# Patient Record
Sex: FEMALE | Race: WHITE | NOT HISPANIC OR LATINO | Employment: PART TIME | URBAN - METROPOLITAN AREA
[De-identification: names, ages, dates, MRNs, and addresses within clinical notes are randomized per-mention and may not be internally consistent; named-entity substitution may affect disease eponyms.]

---
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
## 2022-10-20 ENCOUNTER — APPOINTMENT (OUTPATIENT)
Dept: GENERAL RADIOLOGY | Facility: HOSPITAL | Age: 22
End: 2022-10-20

## 2022-10-20 ENCOUNTER — HOSPITAL ENCOUNTER (EMERGENCY)
Facility: HOSPITAL | Age: 22
Discharge: HOME | End: 2022-10-21
Attending: EMERGENCY MEDICINE | Admitting: EMERGENCY MEDICINE

## 2022-10-20 DIAGNOSIS — M94.0 COSTOCHONDRITIS: ICD-10-CM

## 2022-10-20 DIAGNOSIS — R07.89 RIGHT-SIDED CHEST WALL PAIN: Primary | ICD-10-CM

## 2022-10-20 PROCEDURE — 93005 ELECTROCARDIOGRAM TRACING: CPT

## 2022-10-20 PROCEDURE — 71045 X-RAY EXAM CHEST 1 VIEW: CPT

## 2022-10-20 PROCEDURE — 93005 ELECTROCARDIOGRAM TRACING: CPT | Performed by: EMERGENCY MEDICINE

## 2022-10-20 PROCEDURE — 93010 ELECTROCARDIOGRAM REPORT: CPT | Performed by: INTERNAL MEDICINE

## 2022-10-20 PROCEDURE — 99283 EMERGENCY DEPT VISIT LOW MDM: CPT

## 2022-10-20 PROCEDURE — 36415 COLL VENOUS BLD VENIPUNCTURE: CPT

## 2022-10-21 VITALS
OXYGEN SATURATION: 99 % | HEART RATE: 70 BPM | SYSTOLIC BLOOD PRESSURE: 124 MMHG | RESPIRATION RATE: 18 BRPM | TEMPERATURE: 98.2 F | BODY MASS INDEX: 44.55 KG/M2 | DIASTOLIC BLOOD PRESSURE: 75 MMHG | WEIGHT: 242.06 LBS | HEIGHT: 62 IN

## 2022-10-21 LAB
ALBUMIN SERPL-MCNC: 4 G/DL (ref 3.5–5.2)
ALBUMIN/GLOB SERPL: 1.2 G/DL
ALP SERPL-CCNC: 81 U/L (ref 39–117)
ALT SERPL W P-5'-P-CCNC: 12 U/L (ref 1–33)
AMPHET+METHAMPHET UR QL: NEGATIVE
ANION GAP SERPL CALCULATED.3IONS-SCNC: 9.8 MMOL/L (ref 5–15)
AST SERPL-CCNC: 15 U/L (ref 1–32)
BARBITURATES UR QL SCN: NEGATIVE
BASOPHILS # BLD AUTO: 0.06 10*3/MM3 (ref 0–0.2)
BASOPHILS NFR BLD AUTO: 0.5 % (ref 0–1.5)
BENZODIAZ UR QL SCN: NEGATIVE
BILIRUB SERPL-MCNC: <0.2 MG/DL (ref 0–1.2)
BUN SERPL-MCNC: 14 MG/DL (ref 6–20)
BUN/CREAT SERPL: 18.4 (ref 7–25)
CALCIUM SPEC-SCNC: 9.1 MG/DL (ref 8.6–10.5)
CANNABINOIDS SERPL QL: NEGATIVE
CHLORIDE SERPL-SCNC: 106 MMOL/L (ref 98–107)
CO2 SERPL-SCNC: 24.2 MMOL/L (ref 22–29)
COCAINE UR QL: NEGATIVE
CREAT SERPL-MCNC: 0.76 MG/DL (ref 0.57–1)
DEPRECATED RDW RBC AUTO: 39.5 FL (ref 37–54)
EGFRCR SERPLBLD CKD-EPI 2021: 114.5 ML/MIN/1.73
EOSINOPHIL # BLD AUTO: 0.07 10*3/MM3 (ref 0–0.4)
EOSINOPHIL NFR BLD AUTO: 0.5 % (ref 0.3–6.2)
ERYTHROCYTE [DISTWIDTH] IN BLOOD BY AUTOMATED COUNT: 12 % (ref 12.3–15.4)
GLOBULIN UR ELPH-MCNC: 3.3 GM/DL
GLUCOSE SERPL-MCNC: 92 MG/DL (ref 65–99)
HCG INTACT+B SERPL-ACNC: <0.5 MIU/ML
HCT VFR BLD AUTO: 44.2 % (ref 34–46.6)
HGB BLD-MCNC: 14.3 G/DL (ref 12–15.9)
IMM GRANULOCYTES # BLD AUTO: 0.03 10*3/MM3 (ref 0–0.05)
IMM GRANULOCYTES NFR BLD AUTO: 0.2 % (ref 0–0.5)
LYMPHOCYTES # BLD AUTO: 2.57 10*3/MM3 (ref 0.7–3.1)
LYMPHOCYTES NFR BLD AUTO: 19.4 % (ref 19.6–45.3)
MCH RBC QN AUTO: 29.1 PG (ref 26.6–33)
MCHC RBC AUTO-ENTMCNC: 32.4 G/DL (ref 31.5–35.7)
MCV RBC AUTO: 89.8 FL (ref 79–97)
METHADONE UR QL SCN: NEGATIVE
MONOCYTES # BLD AUTO: 1.11 10*3/MM3 (ref 0.1–0.9)
MONOCYTES NFR BLD AUTO: 8.4 % (ref 5–12)
NEUTROPHILS NFR BLD AUTO: 71 % (ref 42.7–76)
NEUTROPHILS NFR BLD AUTO: 9.41 10*3/MM3 (ref 1.7–7)
NRBC BLD AUTO-RTO: 0 /100 WBC (ref 0–0.2)
OPIATES UR QL: NEGATIVE
OXYCODONE UR QL SCN: NEGATIVE
PLATELET # BLD AUTO: 356 10*3/MM3 (ref 140–450)
PMV BLD AUTO: 8.8 FL (ref 6–12)
POTASSIUM SERPL-SCNC: 3.9 MMOL/L (ref 3.5–5.2)
PROT SERPL-MCNC: 7.3 G/DL (ref 6–8.5)
RBC # BLD AUTO: 4.92 10*6/MM3 (ref 3.77–5.28)
SODIUM SERPL-SCNC: 140 MMOL/L (ref 136–145)
T4 FREE SERPL-MCNC: 0.92 NG/DL (ref 0.93–1.7)
TROPONIN T SERPL-MCNC: <0.01 NG/ML (ref 0–0.03)
TSH SERPL DL<=0.05 MIU/L-ACNC: 1.85 UIU/ML (ref 0.27–4.2)
WBC NRBC COR # BLD: 13.25 10*3/MM3 (ref 3.4–10.8)

## 2022-10-21 PROCEDURE — 80307 DRUG TEST PRSMV CHEM ANLYZR: CPT | Performed by: NURSE PRACTITIONER

## 2022-10-21 PROCEDURE — 36415 COLL VENOUS BLD VENIPUNCTURE: CPT

## 2022-10-21 PROCEDURE — 80050 GENERAL HEALTH PANEL: CPT | Performed by: NURSE PRACTITIONER

## 2022-10-21 PROCEDURE — 84439 ASSAY OF FREE THYROXINE: CPT | Performed by: NURSE PRACTITIONER

## 2022-10-21 PROCEDURE — 84702 CHORIONIC GONADOTROPIN TEST: CPT | Performed by: NURSE PRACTITIONER

## 2022-10-21 PROCEDURE — 84484 ASSAY OF TROPONIN QUANT: CPT | Performed by: NURSE PRACTITIONER

## 2022-10-21 RX ORDER — PREDNISONE 20 MG/1
60 TABLET ORAL DAILY
Qty: 15 TABLET | Refills: 0 | Status: SHIPPED | OUTPATIENT
Start: 2022-10-21 | End: 2022-10-26

## 2022-10-21 NOTE — ED PROVIDER NOTES
Time: 10:29 PM EDT  Chief Complaint:   Chief Complaint   Patient presents with   • Chest Pain   • Anxiety       History of Present Illness:  Patient is a 22 y.o. year old female who presents to the emergency department with c/o chest pain and anxiety.        History provided by:  Patient  Chest Pain  Pain location:  R chest  Pain quality: stabbing    Pain radiates to:  Does not radiate  Pain severity:  Moderate  Onset quality:  Gradual  Duration:  2 days  Timing:  Intermittent  Progression:  Waxing and waning  Chronicity:  New  Context: movement    Relieved by:  Nothing  Worsened by:  Nothing  Ineffective treatments:  None tried  Associated symptoms: anxiety    Associated symptoms: no abdominal pain, no altered mental status, no back pain, no cough, no diaphoresis, no dizziness, no dysphagia, no fatigue, no fever, no headache, no heartburn, no lower extremity edema, no nausea, no near-syncope, no numbness, no orthopnea, no palpitations, no PND, no shortness of breath, no syncope, no vomiting and no weakness    Risk factors: obesity    Anxiety  Symptoms include chest pain and nervous/anxious behavior. Patient reports no dizziness, nausea, palpitations or shortness of breath.           Patient Care Team  Primary Care Provider: Provider, No Known    Past Medical History:     Allergies   Allergen Reactions   • Coconut Unknown - Low Severity     History reviewed. No pertinent past medical history.  History reviewed. No pertinent surgical history.  History reviewed. No pertinent family history.    Home Medications:  Prior to Admission medications    Not on File        Social History:          Review of Systems:  Review of Systems   Constitutional: Negative for chills, diaphoresis, fatigue and fever.   HENT: Negative for ear pain, rhinorrhea, sore throat and trouble swallowing.    Eyes: Negative for visual disturbance.   Respiratory: Negative for cough, chest tightness and shortness of breath.    Cardiovascular: Positive  "for chest pain. Negative for palpitations, orthopnea, syncope, PND and near-syncope.   Gastrointestinal: Negative for abdominal pain, diarrhea, heartburn, nausea and vomiting.   Genitourinary: Negative for difficulty urinating and flank pain.   Musculoskeletal: Negative for arthralgias, back pain and myalgias.   Skin: Negative for rash.   Neurological: Negative for dizziness, weakness, light-headedness, numbness and headaches.   Hematological: Negative for adenopathy.   Psychiatric/Behavioral: The patient is nervous/anxious.    All other systems reviewed and are negative.       Physical Exam:  /71   Pulse 78   Temp 98.2 °F (36.8 °C)   Resp 18   Ht 157.5 cm (62\")   Wt 110 kg (242 lb 1 oz)   LMP  (LMP Unknown)   SpO2 100%   BMI 44.27 kg/m²     Physical Exam  Vitals and nursing note reviewed.   Constitutional:       General: She is not in acute distress.     Appearance: Normal appearance. She is not toxic-appearing.   HENT:      Head: Normocephalic and atraumatic.      Nose: Nose normal.      Mouth/Throat:      Mouth: Mucous membranes are moist.   Eyes:      Extraocular Movements: Extraocular movements intact.      Conjunctiva/sclera: Conjunctivae normal.      Pupils: Pupils are equal, round, and reactive to light.   Cardiovascular:      Rate and Rhythm: Normal rate and regular rhythm.      Pulses: Normal pulses.      Heart sounds: Normal heart sounds.   Pulmonary:      Effort: Pulmonary effort is normal.      Breath sounds: Normal breath sounds.   Chest:      Chest wall: Tenderness ( right chest wall) present.   Abdominal:      General: Bowel sounds are normal. There is no distension.      Palpations: Abdomen is soft.      Tenderness: There is no abdominal tenderness.   Musculoskeletal:         General: Normal range of motion.      Cervical back: Normal range of motion and neck supple.      Right lower leg: No edema.      Left lower leg: No edema.   Skin:     General: Skin is warm and dry. "   Neurological:      General: No focal deficit present.      Mental Status: She is alert and oriented to person, place, and time.   Psychiatric:         Mood and Affect: Mood normal.         Behavior: Behavior normal.         Thought Content: Thought content normal.         Judgment: Judgment normal.            Medications in the Emergency Department:  Medications - No data to display     Labs  Lab Results (last 24 hours)     Procedure Component Value Units Date/Time    hCG, Quantitative, Pregnancy [[id number]] Collected: 10/21/22 0016    Specimen: Blood from Arm, Right Updated: 10/21/22 0043     HCG Quantitative <0.50 mIU/mL     Narrative:      HCG Ranges by Gestational Age    Females - non-pregnant premenopausal   </= 1mIU/mL HCG  Females - postmenopausal               </= 7mIU/mL HCG    3 Weeks       5.4   -      72 mIU/mL  4 Weeks      10.2   -     708 mIU/mL  5 Weeks       217   -   8,245 mIU/mL  6 Weeks       152   -  32,177 mIU/mL  7 Weeks     4,059   - 153,767 mIU/mL  8 Weeks    31,366   - 149,094 mIU/mL  9 Weeks    59,109   - 135,901 mIU/mL  10 Weeks   44,186   - 170,409 mIU/mL  12 Weeks   27,107   - 201,615 mIU/mL  14 Weeks   24,302   -  93,646 mIU/mL  15 Weeks   12,540   -  69,747 mIU/mL  16 Weeks    8,904   -  55,332 mIU/mL  17 Weeks    8,240   -  51,793 mIU/mL  18 Weeks    9,649   -  55,271 mIU/mL    Results may be falsely decreased if patient taking Biotin.      CBC & Differential [[id number]]  (Abnormal) Collected: 10/21/22 0016    Specimen: Blood from Arm, Right Updated: 10/21/22 0025    Narrative:      The following orders were created for panel order CBC & Differential.  Procedure                               Abnormality         Status                     ---------                               -----------         ------                     CBC Auto Differential[[id number]]        Abnormal            Final result                 Please view results for these tests on the individual orders.     Troponin [[id number]]  (Normal) Collected: 10/21/22 0016    Specimen: Blood from Arm, Right Updated: 10/21/22 0046     Troponin T <0.010 ng/mL     Narrative:      Troponin T Reference Range:  <= 0.03 ng/mL-   Negative for AMI  >0.03 ng/mL-     Abnormal for myocardial necrosis.  Clinicians would have to utilize clinical acumen, EKG, Troponin and serial changes to determine if it is an Acute Myocardial Infarction or myocardial injury due to an underlying chronic condition.       Results may be falsely decreased if patient taking Biotin.      Comprehensive Metabolic Panel [[id number]] Collected: 10/21/22 0016    Specimen: Blood from Arm, Right Updated: 10/21/22 0046     Glucose 92 mg/dL      BUN 14 mg/dL      Creatinine 0.76 mg/dL      Sodium 140 mmol/L      Potassium 3.9 mmol/L      Chloride 106 mmol/L      CO2 24.2 mmol/L      Calcium 9.1 mg/dL      Total Protein 7.3 g/dL      Albumin 4.00 g/dL      ALT (SGPT) 12 U/L      AST (SGOT) 15 U/L      Alkaline Phosphatase 81 U/L      Total Bilirubin <0.2 mg/dL      Globulin 3.3 gm/dL      A/G Ratio 1.2 g/dL      BUN/Creatinine Ratio 18.4     Anion Gap 9.8 mmol/L      eGFR 114.5 mL/min/1.73      Comment: National Kidney Foundation and American Society of Nephrology (ASN) Task Force recommended calculation based on the Chronic Kidney Disease Epidemiology Collaboration (CKD-EPI) equation refit without adjustment for race.       Narrative:      GFR Normal >60  Chronic Kidney Disease <60  Kidney Failure <15      CBC Auto Differential [[id number]]  (Abnormal) Collected: 10/21/22 0016    Specimen: Blood from Arm, Right Updated: 10/21/22 0025     WBC 13.25 10*3/mm3      RBC 4.92 10*6/mm3      Hemoglobin 14.3 g/dL      Hematocrit 44.2 %      MCV 89.8 fL      MCH 29.1 pg      MCHC 32.4 g/dL      RDW 12.0 %      RDW-SD 39.5 fl      MPV 8.8 fL      Platelets 356 10*3/mm3      Neutrophil % 71.0 %      Lymphocyte % 19.4 %      Monocyte % 8.4 %      Eosinophil % 0.5 %      Basophil % 0.5 %       Immature Grans % 0.2 %      Neutrophils, Absolute 9.41 10*3/mm3      Lymphocytes, Absolute 2.57 10*3/mm3      Monocytes, Absolute 1.11 10*3/mm3      Eosinophils, Absolute 0.07 10*3/mm3      Basophils, Absolute 0.06 10*3/mm3      Immature Grans, Absolute 0.03 10*3/mm3      nRBC 0.0 /100 WBC     TSH [[id number]]  (Normal) Collected: 10/21/22 0016    Specimen: Blood from Arm, Right Updated: 10/21/22 0108     TSH 1.850 uIU/mL     T4, Free [[id number]]  (Abnormal) Collected: 10/21/22 0016    Specimen: Blood from Arm, Right Updated: 10/21/22 0108     Free T4 0.92 ng/dL     Narrative:      Results may be falsely increased if patient taking Biotin.      Urine Drug Screen - Urine, Clean Catch [[id number]]  (Normal) Collected: 10/21/22 0135    Specimen: Urine, Clean Catch Updated: 10/21/22 0203     Amphet/Methamphet, Screen Negative     Barbiturates Screen, Urine Negative     Benzodiazepine Screen, Urine Negative     Cocaine Screen, Urine Negative     Opiate Screen Negative     THC, Screen, Urine Negative     Methadone Screen, Urine Negative     Oxycodone Screen, Urine Negative    Narrative:      Negative Thresholds Per Drugs Screened:    Amphetamines                 500 ng/ml  Barbiturates                 200 ng/ml  Benzodiazepines              100 ng/ml  Cocaine                      300 ng/ml  Methadone                    300 ng/ml  Opiates                      300 ng/ml  Oxycodone                    100 ng/ml  THC                           50 ng/ml    The Normal Value for all drugs tested is negative. This report includes final unconfirmed screening results to be used for medical treatment purposes only. Unconfirmed results must not be used for non-medical purposes such as employment or legal testing. Clinical consideration should be applied to any drug of abuse test, particularly when unconfirmed results are used.                   Imaging:  XR Chest 1 View    Result Date: 10/21/2022  PROCEDURE: XR CHEST 1 VW   COMPARISON: None..  INDICATIONS: Chest pain  FINDINGS: A single frontal (AP or PA upright portable) chest radiograph reveals no cardiac enlargement and no acute infiltrate. No pneumothorax is seen.  External artifacts obscure detail.       No acute cardiopulmonary disease is seen radiographically.     Please note that portions of this note were completed with a voice recognition program.  GILLIAN ANDERSON JR, MD       Electronically Signed and Approved By: GILLIAN ANDERSON JR, MD on 10/21/2022 at 1:11              Progress  ED Course as of 10/21/22 0205   Thu Oct 20, 2022   2227 --- PROVIDER IN TRIAGE NOTE ---    The patient was seen and evaluated by marin Bales in triage. Orders were placed and the patient is currently awaiting disposition. [KS]   Fri Oct 21, 2022   0156 XR Chest 1 View  negative [DS]      ED Course User Index  [DS] Mirna Edmonds APRN  [KS] Emeli Bales APRN           HEART Score: 0                  The patient was initially evaluated in the triage area where orders were placed. The patient was later dispositioned by DUNIA Vickers.      The patient was advised to stay for completion of workup which includes but is not limited to communication of labs and radiological results, reassessment and plan. The patient was advised that leaving prior to disposition by a provider could result in critical findings that are not communicated to the patient.     Medical Decision Making:  MDM  Number of Diagnoses or Management Options  Right-sided chest wall pain  Diagnosis management comments: I have spoken with the patient. I have explained the patient´s condition, diagnoses and treatment plan based on the information available to me at this time. I have answered the patient's questions and addressed any concerns. The patient has a good  understanding of the patient´s diagnosis, condition, and treatment plan as can be expected at this point. The vital signs have been stable. The patient´s condition is  stable and appropriate for discharge from the emergency department.      The patient will pursue further outpatient evaluation with the primary care physician or other designated or consulting physician as outlined in the discharge instructions. They are agreeable to this plan of care and follow-up instructions have been explained in detail. The patient has received these instructions in written format and have expressed an understanding of the discharge instructions. The patient is aware that any significant change in condition or worsening of symptoms should prompt an immediate return to this or the closest emergency department or call to 911.         Amount and/or Complexity of Data Reviewed  Clinical lab tests: reviewed and ordered  Tests in the radiology section of CPT®: reviewed and ordered  Tests in the medicine section of CPT®: reviewed and ordered    Risk of Complications, Morbidity, and/or Mortality  Presenting problems: moderate  Diagnostic procedures: low  Management options: low    Patient Progress  Patient progress: stable           The following orders were placed after triage and evaluation:  Orders Placed This Encounter   Procedures   • XR Chest 1 View   • hCG, Quantitative, Pregnancy   • Troponin   • Comprehensive Metabolic Panel   • CBC Auto Differential   • Urine Drug Screen - Urine, Clean Catch   • TSH   • T4, Free   • ECG 12 Lead   • CBC & Differential       Final diagnoses:   Right-sided chest wall pain          Disposition:  ED Disposition     ED Disposition   Discharge    Condition   Stable    Comment   --             This medical record created using voice recognition software.           Mirna Edmonds APRN  10/21/22 0205       Mirna Edmonds APRN  10/21/22 0208

## 2022-10-21 NOTE — DISCHARGE INSTRUCTIONS
All testing here today was negative including chest x-ray    Take medication as prescribed for symptomatic treatment    Return for new or worsening symptoms    Follow-up with PCP

## 2022-10-28 LAB — QT INTERVAL: 370 MS

## 2023-07-20 PROBLEM — E66.9 OBESITY: Status: ACTIVE | Noted: 2023-07-12

## 2023-07-20 PROBLEM — K59.04 CHRONIC IDIOPATHIC CONSTIPATION: Status: ACTIVE | Noted: 2023-07-20

## 2023-07-20 PROBLEM — F41.9 ANXIETY: Status: ACTIVE | Noted: 2023-07-20

## 2023-07-20 PROBLEM — G93.32 CHRONIC FATIGUE SYNDROME: Status: ACTIVE | Noted: 2023-07-20

## 2023-09-07 ENCOUNTER — HOSPITAL ENCOUNTER (EMERGENCY)
Facility: HOSPITAL | Age: 23
Discharge: HOME | End: 2023-09-07
Attending: EMERGENCY MEDICINE
Payer: COMMERCIAL

## 2023-09-07 ENCOUNTER — APPOINTMENT (OUTPATIENT)
Dept: CT IMAGING | Facility: HOSPITAL | Age: 23
End: 2023-09-07
Payer: COMMERCIAL

## 2023-09-07 VITALS
BODY MASS INDEX: 39.11 KG/M2 | HEIGHT: 62 IN | HEART RATE: 76 BPM | RESPIRATION RATE: 18 BRPM | DIASTOLIC BLOOD PRESSURE: 85 MMHG | WEIGHT: 212.52 LBS | SYSTOLIC BLOOD PRESSURE: 103 MMHG | TEMPERATURE: 98.8 F | OXYGEN SATURATION: 100 %

## 2023-09-07 DIAGNOSIS — N20.0 RIGHT KIDNEY STONE: ICD-10-CM

## 2023-09-07 DIAGNOSIS — N23 RENAL COLIC: Primary | ICD-10-CM

## 2023-09-07 LAB
ALBUMIN SERPL-MCNC: 4 G/DL (ref 3.5–5.2)
ALBUMIN/GLOB SERPL: 1.3 G/DL
ALP SERPL-CCNC: 93 U/L (ref 39–117)
ALT SERPL W P-5'-P-CCNC: 33 U/L (ref 1–33)
ANION GAP SERPL CALCULATED.3IONS-SCNC: 8.5 MMOL/L (ref 5–15)
AST SERPL-CCNC: 19 U/L (ref 1–32)
BACTERIA UR QL AUTO: ABNORMAL /HPF
BASOPHILS # BLD AUTO: 0.05 10*3/MM3 (ref 0–0.2)
BASOPHILS NFR BLD AUTO: 0.4 % (ref 0–1.5)
BILIRUB SERPL-MCNC: 0.3 MG/DL (ref 0–1.2)
BILIRUB UR QL STRIP: NEGATIVE
BUN SERPL-MCNC: 17 MG/DL (ref 6–20)
BUN/CREAT SERPL: 13.7 (ref 7–25)
CALCIUM SPEC-SCNC: 9.3 MG/DL (ref 8.6–10.5)
CHLORIDE SERPL-SCNC: 108 MMOL/L (ref 98–107)
CLARITY UR: ABNORMAL
CO2 SERPL-SCNC: 22.5 MMOL/L (ref 22–29)
COLOR UR: YELLOW
CREAT SERPL-MCNC: 1.24 MG/DL (ref 0.57–1)
DEPRECATED RDW RBC AUTO: 39.4 FL (ref 37–54)
EGFRCR SERPLBLD CKD-EPI 2021: 63.2 ML/MIN/1.73
EOSINOPHIL # BLD AUTO: 0.03 10*3/MM3 (ref 0–0.4)
EOSINOPHIL NFR BLD AUTO: 0.2 % (ref 0.3–6.2)
ERYTHROCYTE [DISTWIDTH] IN BLOOD BY AUTOMATED COUNT: 12.1 % (ref 12.3–15.4)
GLOBULIN UR ELPH-MCNC: 3 GM/DL
GLUCOSE SERPL-MCNC: 106 MG/DL (ref 65–99)
GLUCOSE UR STRIP-MCNC: NEGATIVE MG/DL
HCG INTACT+B SERPL-ACNC: <0.5 MIU/ML
HCT VFR BLD AUTO: 42.7 % (ref 34–46.6)
HGB BLD-MCNC: 14.2 G/DL (ref 12–15.9)
HGB UR QL STRIP.AUTO: ABNORMAL
HOLD SPECIMEN: NORMAL
HOLD SPECIMEN: NORMAL
HYALINE CASTS UR QL AUTO: ABNORMAL /LPF
IMM GRANULOCYTES # BLD AUTO: 0.03 10*3/MM3 (ref 0–0.05)
IMM GRANULOCYTES NFR BLD AUTO: 0.2 % (ref 0–0.5)
KETONES UR QL STRIP: NEGATIVE
LEUKOCYTE ESTERASE UR QL STRIP.AUTO: ABNORMAL
LIPASE SERPL-CCNC: 32 U/L (ref 13–60)
LYMPHOCYTES # BLD AUTO: 1.42 10*3/MM3 (ref 0.7–3.1)
LYMPHOCYTES NFR BLD AUTO: 10.4 % (ref 19.6–45.3)
MCH RBC QN AUTO: 29.6 PG (ref 26.6–33)
MCHC RBC AUTO-ENTMCNC: 33.3 G/DL (ref 31.5–35.7)
MCV RBC AUTO: 89 FL (ref 79–97)
MONOCYTES # BLD AUTO: 0.95 10*3/MM3 (ref 0.1–0.9)
MONOCYTES NFR BLD AUTO: 7 % (ref 5–12)
NEUTROPHILS NFR BLD AUTO: 11.11 10*3/MM3 (ref 1.7–7)
NEUTROPHILS NFR BLD AUTO: 81.8 % (ref 42.7–76)
NITRITE UR QL STRIP: NEGATIVE
NRBC BLD AUTO-RTO: 0 /100 WBC (ref 0–0.2)
PH UR STRIP.AUTO: 7.5 [PH] (ref 5–8)
PLATELET # BLD AUTO: 322 10*3/MM3 (ref 140–450)
PMV BLD AUTO: 9.3 FL (ref 6–12)
POTASSIUM SERPL-SCNC: 4 MMOL/L (ref 3.5–5.2)
PROT SERPL-MCNC: 7 G/DL (ref 6–8.5)
PROT UR QL STRIP: NEGATIVE
RBC # BLD AUTO: 4.8 10*6/MM3 (ref 3.77–5.28)
RBC # UR STRIP: ABNORMAL /HPF
REF LAB TEST METHOD: ABNORMAL
SODIUM SERPL-SCNC: 139 MMOL/L (ref 136–145)
SP GR UR STRIP: 1.02 (ref 1–1.03)
SQUAMOUS #/AREA URNS HPF: ABNORMAL /HPF
UROBILINOGEN UR QL STRIP: ABNORMAL
WBC # UR STRIP: ABNORMAL /HPF
WBC NRBC COR # BLD: 13.59 10*3/MM3 (ref 3.4–10.8)
WHOLE BLOOD HOLD COAG: NORMAL
WHOLE BLOOD HOLD SPECIMEN: NORMAL

## 2023-09-07 PROCEDURE — 96374 THER/PROPH/DIAG INJ IV PUSH: CPT

## 2023-09-07 PROCEDURE — 84702 CHORIONIC GONADOTROPIN TEST: CPT

## 2023-09-07 PROCEDURE — 81001 URINALYSIS AUTO W/SCOPE: CPT

## 2023-09-07 PROCEDURE — 96361 HYDRATE IV INFUSION ADD-ON: CPT

## 2023-09-07 PROCEDURE — 25510000001 IOPAMIDOL PER 1 ML: Performed by: EMERGENCY MEDICINE

## 2023-09-07 PROCEDURE — 99285 EMERGENCY DEPT VISIT HI MDM: CPT

## 2023-09-07 PROCEDURE — 25010000002 KETOROLAC TROMETHAMINE PER 15 MG: Performed by: NURSE PRACTITIONER

## 2023-09-07 PROCEDURE — 83690 ASSAY OF LIPASE: CPT

## 2023-09-07 PROCEDURE — 36415 COLL VENOUS BLD VENIPUNCTURE: CPT

## 2023-09-07 PROCEDURE — 74177 CT ABD & PELVIS W/CONTRAST: CPT

## 2023-09-07 PROCEDURE — 85025 COMPLETE CBC W/AUTO DIFF WBC: CPT

## 2023-09-07 PROCEDURE — 80053 COMPREHEN METABOLIC PANEL: CPT

## 2023-09-07 RX ORDER — NAPROXEN 500 MG/1
500 TABLET ORAL 2 TIMES DAILY WITH MEALS
Qty: 20 TABLET | Refills: 0 | Status: SHIPPED | OUTPATIENT
Start: 2023-09-07

## 2023-09-07 RX ORDER — PHENTERMINE HYDROCHLORIDE 37.5 MG/1
37.5 TABLET ORAL
COMMUNITY
Start: 2023-08-15

## 2023-09-07 RX ORDER — SODIUM CHLORIDE 0.9 % (FLUSH) 0.9 %
10 SYRINGE (ML) INJECTION AS NEEDED
Status: DISCONTINUED | OUTPATIENT
Start: 2023-09-07 | End: 2023-09-07 | Stop reason: HOSPADM

## 2023-09-07 RX ORDER — HYDROCODONE BITARTRATE AND ACETAMINOPHEN 7.5; 325 MG/1; MG/1
1-2 TABLET ORAL EVERY 6 HOURS PRN
Qty: 15 TABLET | Refills: 0 | Status: SHIPPED | OUTPATIENT
Start: 2023-09-07 | End: 2023-09-11

## 2023-09-07 RX ORDER — KETOROLAC TROMETHAMINE 30 MG/ML
30 INJECTION, SOLUTION INTRAMUSCULAR; INTRAVENOUS ONCE
Status: COMPLETED | OUTPATIENT
Start: 2023-09-07 | End: 2023-09-07

## 2023-09-07 RX ORDER — ONDANSETRON 4 MG/1
4 TABLET, ORALLY DISINTEGRATING ORAL EVERY 4 HOURS PRN
Qty: 15 TABLET | Refills: 0 | Status: SHIPPED | OUTPATIENT
Start: 2023-09-07

## 2023-09-07 RX ORDER — TAMSULOSIN HYDROCHLORIDE 0.4 MG/1
1 CAPSULE ORAL DAILY
Qty: 12 CAPSULE | Refills: 0 | Status: SHIPPED | OUTPATIENT
Start: 2023-09-07

## 2023-09-07 RX ADMIN — KETOROLAC TROMETHAMINE 30 MG: 30 INJECTION, SOLUTION INTRAMUSCULAR; INTRAVENOUS at 05:21

## 2023-09-07 RX ADMIN — SODIUM CHLORIDE 1000 ML: 9 INJECTION, SOLUTION INTRAVENOUS at 05:21

## 2023-09-07 RX ADMIN — IOPAMIDOL 100 ML: 755 INJECTION, SOLUTION INTRAVENOUS at 06:26

## 2023-09-07 NOTE — DISCHARGE INSTRUCTIONS
Drink plenty fluids.    Take medications as prescribed.    Follow-up with urologist.    Return for new or worsening symptoms

## 2023-09-07 NOTE — ED PROVIDER NOTES
Time: 4:50 AM EDT  Date of encounter:  9/7/2023  Independent Historian/Clinical History and Information was obtained by:   Patient    History is limited by: N/A    Chief Complaint: Right side pain      History of Present Illness:  Patient is a 23 y.o. year old female who presents to the emergency department for evaluation of right side pain since Monday.  Patient is complaining of sharp stabbing aching pain in her right lower quadrant that radiates around to her right side back.  Pain has been constant but waxing and waning in severity.  Pain randomly increases for no known cause and then eases back off.  Patient has not been taking anything at home.  No nausea vomiting or diarrhea.  Patient reports a feeling of urgency and frequency but then when tries to urinate not urinating very much.  No dysuria.  Patient denies fever.  No bleeding or vaginal discharge.    HPI    Patient Care Team  Primary Care Provider: Mey Manzanares APRN    Past Medical History:     Allergies   Allergen Reactions    Coconut Unknown - Low Severity     History reviewed. No pertinent past medical history.  Past Surgical History:   Procedure Laterality Date    ANKLE SURGERY Right      Family History   Problem Relation Age of Onset    Hypertension Father     Breast cancer Neg Hx     Ovarian cancer Neg Hx     Uterine cancer Neg Hx     Colon cancer Neg Hx     Diabetes Neg Hx        Home Medications:  Prior to Admission medications    Medication Sig Start Date End Date Taking? Authorizing Provider   phentermine (ADIPEX-P) 37.5 MG tablet Take 1 tablet by mouth Every Morning Before Breakfast. 8/15/23  Yes Provider, MD Michael   norelgestromin-ethinyl estradiol (Xulane) 150-35 MCG/24HR Place 1 patch on the skin as directed by provider 1 (One) Time Per Week. Apply one patch weekly for 3 weeks, leave off one week to have cycle, then repeat. 7/20/23   Wilver Forrester APRN        Social History:   Social History     Tobacco Use    Smoking status:  "Never    Smokeless tobacco: Never   Vaping Use    Vaping Use: Every day    Substances: Nicotine, Flavoring   Substance Use Topics    Alcohol use: Never    Drug use: Never         Review of Systems:  Review of Systems   Constitutional:  Negative for chills and fever.   HENT:  Negative for congestion, ear pain and sore throat.    Eyes:  Negative for pain.   Respiratory:  Negative for cough, chest tightness and shortness of breath.    Cardiovascular:  Negative for chest pain.   Gastrointestinal:  Positive for abdominal pain. Negative for diarrhea, nausea and vomiting.   Genitourinary:  Positive for difficulty urinating, frequency and urgency. Negative for dysuria, flank pain, hematuria, vaginal bleeding and vaginal discharge.   Musculoskeletal:  Negative for joint swelling.   Skin:  Negative for pallor.   Neurological:  Negative for seizures and headaches.   Hematological: Negative.    Psychiatric/Behavioral: Negative.     All other systems reviewed and are negative.     Physical Exam:  /85   Pulse 76   Temp 98.8 °F (37.1 °C)   Resp 18   Ht 157.5 cm (62\")   Wt 96.4 kg (212 lb 8.4 oz)   LMP 09/05/2023   SpO2 100%   BMI 38.87 kg/m²     Physical Exam  Vitals and nursing note reviewed.   Constitutional:       General: She is not in acute distress.     Appearance: Normal appearance. She is not toxic-appearing.   HENT:      Head: Normocephalic and atraumatic.      Nose: Nose normal.      Mouth/Throat:      Mouth: Mucous membranes are moist.   Eyes:      General: No scleral icterus.     Conjunctiva/sclera: Conjunctivae normal.   Cardiovascular:      Rate and Rhythm: Normal rate and regular rhythm.      Pulses: Normal pulses.      Heart sounds: Normal heart sounds.   Pulmonary:      Effort: Pulmonary effort is normal. No respiratory distress.      Breath sounds: Normal breath sounds.   Abdominal:      General: Bowel sounds are normal.      Palpations: Abdomen is soft.      Tenderness: There is abdominal " tenderness (Right lower quadrant). There is right CVA tenderness. There is no left CVA tenderness.   Musculoskeletal:         General: Normal range of motion.      Cervical back: Normal range of motion and neck supple.   Skin:     General: Skin is warm and dry.   Neurological:      Mental Status: She is alert and oriented to person, place, and time.   Psychiatric:         Mood and Affect: Mood normal.         Behavior: Behavior normal.              Medical Decision Making:      Comorbidities that affect care:    None    External Notes reviewed:    Previous Clinic Note: Patient routine office visit August 15      The following orders were placed and all results were independently analyzed by me:  Orders Placed This Encounter   Procedures    CT Abdomen Pelvis With Contrast    [place] Draw    Comprehensive Metabolic Panel    Lipase    Urinalysis With Microscopic If Indicated (No Culture) - Urine, Clean Catch    hCG, Quantitative, Pregnancy    CBC Auto Differential    Urinalysis, Microscopic Only - Urine, Clean Catch    NPO Diet NPO Type: Strict NPO    Undress & Gown    Insert Peripheral IV    CBC & Differential    Green Top (Gel)    Lavender Top    Gold Top - SST    Light Blue Top       Medications Given in the Emergency Department:  Medications   sodium chloride 0.9 % flush 10 mL (has no administration in time range)   ketorolac (TORADOL) injection 30 mg (30 mg Intravenous Given 9/7/23 0521)   sodium chloride 0.9 % bolus 1,000 mL (1,000 mL Intravenous New Bag 9/7/23 0521)   iopamidol (ISOVUE-370) 76 % injection 100 mL (100 mL Intravenous Given 9/7/23 0626)        ED Course:    ED Course as of 09/07/23 0700   Thu Sep 07, 2023   0640 Patient pain-free at discharge [DS]      ED Course User Index  [DS] Mirna Edmonds APRN       Labs:    Lab Results (last 24 hours)       Procedure Component Value Units Date/Time    CBC & Differential [[id number]]  (Abnormal) Collected: 09/07/23 0256    Specimen: Blood Updated: 09/07/23 9033     Narrative:      The following orders were created for panel order CBC & Differential.  Procedure                               Abnormality         Status                     ---------                               -----------         ------                     CBC Auto Differential[[id number]]        Abnormal            Final result                 Please view results for these tests on the individual orders.    Comprehensive Metabolic Panel [[id number]]  (Abnormal) Collected: 09/07/23 0256    Specimen: Blood Updated: 09/07/23 0332     Glucose 106 mg/dL      BUN 17 mg/dL      Creatinine 1.24 mg/dL      Sodium 139 mmol/L      Potassium 4.0 mmol/L      Chloride 108 mmol/L      CO2 22.5 mmol/L      Calcium 9.3 mg/dL      Total Protein 7.0 g/dL      Albumin 4.0 g/dL      ALT (SGPT) 33 U/L      AST (SGOT) 19 U/L      Alkaline Phosphatase 93 U/L      Total Bilirubin 0.3 mg/dL      Globulin 3.0 gm/dL      A/G Ratio 1.3 g/dL      BUN/Creatinine Ratio 13.7     Anion Gap 8.5 mmol/L      eGFR 63.2 mL/min/1.73     Narrative:      GFR Normal >60  Chronic Kidney Disease <60  Kidney Failure <15      Lipase [[id number]]  (Normal) Collected: 09/07/23 0256    Specimen: Blood Updated: 09/07/23 0332     Lipase 32 U/L     hCG, Quantitative, Pregnancy [[id number]] Collected: 09/07/23 0256    Specimen: Blood Updated: 09/07/23 0356     HCG Quantitative <0.50 mIU/mL     Narrative:      HCG Ranges by Gestational Age    Females - non-pregnant premenopausal   </= 1mIU/mL HCG  Females - postmenopausal               </= 7mIU/mL HCG    3 Weeks       5.4   -      72 mIU/mL  4 Weeks      10.2   -     708 mIU/mL  5 Weeks       217   -   8,245 mIU/mL  6 Weeks       152   -  32,177 mIU/mL  7 Weeks     4,059   - 153,767 mIU/mL  8 Weeks    31,366   - 149,094 mIU/mL  9 Weeks    59,109   - 135,901 mIU/mL  10 Weeks   44,186   - 170,409 mIU/mL  12 Weeks   27,107   - 201,615 mIU/mL  14 Weeks   24,302   -  93,646 mIU/mL  15 Weeks   12,540   -  69,747  mIU/mL  16 Weeks    8,904   -  55,332 mIU/mL  17 Weeks    8,240   -  51,793 mIU/mL  18 Weeks    9,649   -  55,271 mIU/mL      CBC Auto Differential [[id number]]  (Abnormal) Collected: 09/07/23 0256    Specimen: Blood Updated: 09/07/23 0307     WBC 13.59 10*3/mm3      RBC 4.80 10*6/mm3      Hemoglobin 14.2 g/dL      Hematocrit 42.7 %      MCV 89.0 fL      MCH 29.6 pg      MCHC 33.3 g/dL      RDW 12.1 %      RDW-SD 39.4 fl      MPV 9.3 fL      Platelets 322 10*3/mm3      Neutrophil % 81.8 %      Lymphocyte % 10.4 %      Monocyte % 7.0 %      Eosinophil % 0.2 %      Basophil % 0.4 %      Immature Grans % 0.2 %      Neutrophils, Absolute 11.11 10*3/mm3      Lymphocytes, Absolute 1.42 10*3/mm3      Monocytes, Absolute 0.95 10*3/mm3      Eosinophils, Absolute 0.03 10*3/mm3      Basophils, Absolute 0.05 10*3/mm3      Immature Grans, Absolute 0.03 10*3/mm3      nRBC 0.0 /100 WBC     Urinalysis With Microscopic If Indicated (No Culture) - Urine, Clean Catch [[id number]]  (Abnormal) Collected: 09/07/23 0349    Specimen: Urine, Clean Catch Updated: 09/07/23 0422     Color, UA Yellow     Appearance, UA Cloudy     pH, UA 7.5     Specific Gravity, UA 1.016     Glucose, UA Negative     Ketones, UA Negative     Bilirubin, UA Negative     Blood, UA Large (3+)     Protein, UA Negative     Leuk Esterase, UA Trace     Nitrite, UA Negative     Urobilinogen, UA 1.0 E.U./dL    Urinalysis, Microscopic Only - Urine, Clean Catch [[id number]]  (Abnormal) Collected: 09/07/23 0349    Specimen: Urine, Clean Catch Updated: 09/07/23 0422     RBC, UA Too Numerous to Count /HPF      WBC, UA 3-5 /HPF      Bacteria, UA None Seen /HPF      Squamous Epithelial Cells, UA 0-2 /HPF      Hyaline Casts, UA 0-2 /LPF      Methodology Automated Microscopy             Imaging:    CT Abdomen Pelvis With Contrast    Result Date: 9/7/2023  PROCEDURE: CT ABDOMEN PELVIS W CONTRAST  COMPARISON: None.  INDICATIONS: Right flank pain; +dysuria; possible pyelonephritis.   TECHNIQUE: After obtaining the patient's consent, 1,103 CT images were created with non-ionic intravenous contrast material.   PROTOCOL:   Standard CT imaging protocol performed.    RADIATION:   Total DLP: 1460 mGy*cm.   Automated exposure control was utilized to minimize radiation dose. CONTRAST: 100 mL Isovue 370 I.V.  FINDINGS: There is obstructive uropathy on the right due to a distal right ureteral calculus, measuring approximately 4 mm.  It is located just upstream from the right ureterovesical junction (UVJ).  The CT number for the obstructing distal right ureteral calculus is approximately 764 Hounsfield units.  Mild right-sided hydronephrosis/hydroureter is seen.  Associated right-sided pyelonephritis cannot be excluded.  No ectopic gas collections are seen, such as within the lumen of the pelvicaliceal systems or ureters to suggest emphysematous pyelitis/ureteritis.  Nonobstructing renal calyceal stones are seen bilaterally, measuring 5 mm or less in size.  No left-sided hydronephrosis or hydroureter.  No left-sided ureterolithiasis.  No left-sided pyelonephritis.  There is slight delay in function of the right kidney relative to the left kidney.  No acute appendicitis.  There is nonspecific distension of the urinary bladder.  No suspicious uterine or adnexal mass.  The partially imaged lung bases are clear of acute infiltrate.  There may be mild subsegmental atelectasis within the lung bases.  There is motion artifact, particularly obscuring the lung bases.  No bowel wall thickening or mechanical bowel obstruction.  No gallstones or acute cholecystitis.  Minimal if any ascites is seen.  No acute intraperitoneal or retroperitoneal hemorrhage.  No acute fracture or aggressive osseous lesion is suggested.       There is obstructive uropathy on the right due to a 4 mm distal right ureteral calculus, which is just upstream from the right UVJ.  There is associated mild right hydronephrosis and right  hydroureter.  Nonobstructing renal calyceal stones are seen bilaterally.  Associated right-sided pyelonephritis cannot be excluded.  No intrarenal abscess or perinephric fluid collection is seen to suggest abscess.  Please see above comments for further detail.    Please note that portions of this note were completed with a voice recognition program.  GILLIAN ANDERSON JR, MD       Electronically Signed and Approved By: GILLIAN ANDERSON JR, MD on 9/07/2023 at 6:48                 Differential Diagnosis and Discussion:    Abdominal Pain: Based on the patient's signs and symptoms, I considered abdominal aortic aneurysm, small bowel obstruction, pancreatitis, acute cholecystitis, acute appendecitis, peptic ulcer disease, gastritis, colitis, endocrine disorders, irritable bowel syndrome and other differential diagnosis an etiology of the patient's abdominal pain.  Flank Pain: Differential diagnosis includes but is not limited to kidney stones, pyelonephritis, musculoskeletal disorders, renal infarction, urinary tract infection, hydronephrosis, radiculopathy, aortic aneurysm, renal cell carcinoma.    All labs were reviewed and interpreted by me.  CT scan radiology impression was interpreted by me.    MDM  Number of Diagnoses or Management Options  Renal colic  Right kidney stone  Diagnosis management comments: The patient presents with flank pain. Patient was found to have ureterolithiasis on CT. The patient´s labs and urinalysis were reviewed. Patient is now resting comfortably, feels better, is alert, and is in no distress. The repeat examination is unremarkable and benign. The patient has no signs of urosepsis. The patient is referred to the on-call urologist for follow up and is discharged with oral medication for pain control and Flomax. The patient was counseled to return to the ER for fever >100.5, intractable pain or vomiting, or any other concerns that the may have. The patient has expressed a clear and thorough  understanding and agreed to follow up as instructed.         Amount and/or Complexity of Data Reviewed  Clinical lab tests: reviewed and ordered  Tests in the radiology section of CPT®: reviewed and ordered  Tests in the medicine section of CPT®: ordered and reviewed    Risk of Complications, Morbidity, and/or Mortality  Presenting problems: moderate  Diagnostic procedures: moderate  Management options: low    Patient Progress  Patient progress: stable         Patient Care Considerations:    CONSULT: I considered consulting urology, however patient is pain-free.  Urinalysis has no bacteria and urine is negative nitrate .  Patient can follow-up outpatient      Consultants/Shared Management Plan:    None    Social Determinants of Health:    Patient is independent, reliable, and has access to care.       Disposition and Care Coordination:    Discharged: I considered escalation of care by admitting this patient for observation, however the patient has improved and is suitable and  stable for discharge.    I have explained the patient´s condition, diagnoses and treatment plan based on the information available to me at this time. I have answered questions and addressed any concerns. The patient has a good  understanding of the patient´s diagnosis, condition, and treatment plan as can be expected at this point. The vital signs have been stable. The patient´s condition is stable and appropriate for discharge from the emergency department.      The patient will pursue further outpatient evaluation with the primary care physician or other designated or consulting physician as outlined in the discharge instructions. They are agreeable to this plan of care and follow-up instructions have been explained in detail. The patient has received these instructions in written format and have expressed an understanding of the discharge instructions. The patient is aware that any significant change in condition or worsening of symptoms  should prompt an immediate return to this or the closest emergency department or call to 911.  I have explained discharge medications and the need for follow up with the patient/caretakers. This was also printed in the discharge instructions. Patient was discharged with the following medications and follow up:      Medication List        New Prescriptions      HYDROcodone-acetaminophen 7.5-325 MG per tablet  Commonly known as: NORCO  Take 1-2 tablets by mouth Every 6 (Six) Hours As Needed for Moderate Pain (pain).     naproxen 500 MG tablet  Commonly known as: NAPROSYN  Take 1 tablet by mouth 2 (Two) Times a Day With Meals.     ondansetron ODT 4 MG disintegrating tablet  Commonly known as: ZOFRAN-ODT  Place 1 tablet on the tongue Every 4 (Four) Hours As Needed for Nausea for up to 15 doses.     tamsulosin 0.4 MG capsule 24 hr capsule  Commonly known as: Flomax  Take 1 capsule by mouth Daily. Discontinue if stone passes or lightheaded when upright.               Where to Get Your Medications        These medications were sent to [id number] - [place] [id number] AT [facility] OF [address] [place] & [place] - [phone] [facility] [phone] [address]  [address] [facility] [address]      Phone: [phone]   HYDROcodone-acetaminophen 7.5-325 MG per tablet  naproxen 500 MG tablet  ondansetron ODT 4 MG disintegrating tablet  tamsulosin 0.4 MG capsule 24 hr capsule      Katharine Gonzales MD  [facility] [facility] [address] [phone]    Call today  schedule appt for re-eval 4 mm right side stone       Final diagnoses:   Renal colic   Right kidney stone        ED Disposition       ED Disposition   Discharge    Condition   Stable    Comment   --               This medical record created using voice recognition software.             Mirna Edmonds APRN  09/07/23 0700

## 2023-09-07 NOTE — Clinical Note
[facility] EMERGENCY ROOM  [address]  [place] [address]  Phone: [phone]    Jennifer Francis was seen and treated in our emergency department on 9/7/2023.  She may return to work on 09/10/2023.         Thank you for choosing [facility].    Mirna Edmonds APRN

## 2023-09-08 ENCOUNTER — TELEPHONE (OUTPATIENT)
Dept: UROLOGY | Facility: CLINIC | Age: 23
End: 2023-09-08
Payer: COMMERCIAL

## 2023-09-11 ENCOUNTER — OFFICE VISIT (OUTPATIENT)
Dept: UROLOGY | Facility: CLINIC | Age: 23
End: 2023-09-11
Payer: COMMERCIAL

## 2023-09-11 VITALS — WEIGHT: 215 LBS | BODY MASS INDEX: 39.56 KG/M2 | HEIGHT: 62 IN

## 2023-09-11 DIAGNOSIS — N20.0 KIDNEY STONES: Primary | ICD-10-CM

## 2023-09-11 LAB
BILIRUB BLD-MCNC: NEGATIVE MG/DL
CLARITY, POC: CLEAR
COLOR UR: YELLOW
EXPIRATION DATE: ABNORMAL
GLUCOSE UR STRIP-MCNC: NEGATIVE MG/DL
KETONES UR QL: NEGATIVE
LEUKOCYTE EST, POC: ABNORMAL
Lab: ABNORMAL
NITRITE UR-MCNC: NEGATIVE MG/ML
PH UR: 5.5 [PH] (ref 5–8)
PROT UR STRIP-MCNC: NEGATIVE MG/DL
RBC # UR STRIP: ABNORMAL /UL
SP GR UR: 1.02 (ref 1–1.03)
UROBILINOGEN UR QL: ABNORMAL

## 2023-09-11 PROCEDURE — 81003 URINALYSIS AUTO W/O SCOPE: CPT | Performed by: UROLOGY

## 2023-09-11 PROCEDURE — 99203 OFFICE O/P NEW LOW 30 MIN: CPT | Performed by: UROLOGY

## 2023-09-11 NOTE — PROGRESS NOTES
"Chief Complaint  Kidney stones    Subjective          Autumn Penny presents to [facility] UROLOGY    History of Present Illness  Patient was seen in the ER recently with flank pain.  She denies gross hematuria.  Her pain has not gone away.  She has not passed the stone that she is aware of.  I reviewed her CT scan with her today which reveals a few small renal stones bilaterally up to 4 mm on the right and 1 to 2 mm on the left.  She also has a distal 4 mm right UVJ stone.      Objective   Vital Signs:   Ht 157.5 cm (62\")   Wt 97.5 kg (215 lb)   BMI 39.32 kg/m²       Physical Exam  Vitals and nursing note reviewed.   Constitutional:       Appearance: Normal appearance. She is well-developed.   Pulmonary:      Effort: Pulmonary effort is normal.      Breath sounds: Normal air entry.   Neurological:      Mental Status: She is alert and oriented to person, place, and time.      Motor: Motor function is intact.   Psychiatric:         Mood and Affect: Mood normal.         Behavior: Behavior normal.        Result Review :   The following data was reviewed by: Katharine Gonzales MD on 09/11/2023:    Results for orders placed or performed in visit on 09/11/23   POC Urinalysis Dipstick, Automated    Specimen: Urine   Result Value Ref Range    Color Yellow Yellow, Straw, Dark Yellow, Lucrecia    Clarity, UA Clear Clear    Specific Gravity  1.020 1.005 - 1.030    pH, Urine 5.5 5.0 - 8.0    Leukocytes Small (1+) (A) Negative    Nitrite, UA Negative Negative    Protein, POC Negative Negative mg/dL    Glucose, UA Negative Negative mg/dL    Ketones, UA Negative Negative    Urobilinogen, UA 0.2 E.U./dL Normal, 0.2 E.U./dL    Bilirubin Negative Negative    Blood, UA Trace (A) Negative    Lot Number [id number]     Expiration Date 92,024          Data reviewed : Radiologic studies CT scan:      Study Result    Narrative & Impression   PROCEDURE:  CT ABDOMEN PELVIS W CONTRAST     COMPARISON: None.     INDICATIONS:  " Right flank pain; +dysuria; possible pyelonephritis.     TECHNIQUE:    After obtaining the patient's consent, 1,103 CT images were created with non-ionic   intravenous contrast material.       PROTOCOL:     Standard CT imaging protocol performed.                 RADIATION:      Total DLP: 1460 mGy*cm.               Automated exposure control was utilized to minimize radiation dose.   CONTRAST:      100 mL Isovue 370 I.V.     FINDINGS:        There is obstructive uropathy on the right due to a distal right ureteral calculus,   measuring approximately 4 mm.  It is located just upstream from the right ureterovesical junction   (UVJ).  The CT number for the obstructing distal right ureteral calculus is approximately 764   Hounsfield units.  Mild right-sided hydronephrosis/hydroureter is seen.  Associated right-sided   pyelonephritis cannot be excluded.  No ectopic gas collections are seen, such as within the lumen   of the pelvicaliceal systems or ureters to suggest emphysematous pyelitis/ureteritis.    Nonobstructing renal calyceal stones are seen bilaterally, measuring 5 mm or less in size.  No   left-sided hydronephrosis or hydroureter.  No left-sided ureterolithiasis.  No left-sided   pyelonephritis.  There is slight delay in function of the right kidney relative to the left kidney.    No acute appendicitis.  There is nonspecific distension of the urinary bladder.  No suspicious   uterine or adnexal mass.  The partially imaged lung bases are clear of acute infiltrate.  There may   be mild subsegmental atelectasis within the lung bases.  There is motion artifact, particularly   obscuring the lung bases.  No bowel wall thickening or mechanical bowel obstruction.  No gallstones   or acute cholecystitis.  Minimal if any ascites is seen.  No acute intraperitoneal or   retroperitoneal hemorrhage.  No acute fracture or aggressive osseous lesion is suggested.     IMPRESSION:               There is obstructive uropathy on  the right due to a 4 mm distal right ureteral   calculus, which is just upstream from the right UVJ.  There is associated mild right hydronephrosis   and right hydroureter.  Nonobstructing renal calyceal stones are seen bilaterally.  Associated   right-sided pyelonephritis cannot be excluded.  No intrarenal abscess or perinephric fluid   collection is seen to suggest abscess.  Please see above comments for further detail.          Please note that portions of this note were completed with a voice recognition program.     GILLIAN ANDERSON JR, MD         Electronically Signed and Approved By: GILLIAN ANDERSON JR, MD on 9/07/2023 at 6:48               Assessment and Plan    Diagnoses and all orders for this visit:    1. Kidney stones (Primary)  -     POC Urinalysis Dipstick, Automated  -     XR Abdomen KUB; Future    She will call me if she has not passed her stone within the next week.  Stone handout prevention guidelines are given to the patient.  We will plan on seeing her in 1 year with a KUB if she passes the stone.          Follow Up       No follow-ups on file.  Patient was given instructions and counseling regarding her condition or for health maintenance advice. Please see specific information pulled into the AVS if appropriate.

## 2023-09-19 ENCOUNTER — TELEPHONE (OUTPATIENT)
Dept: UROLOGY | Facility: CLINIC | Age: 23
End: 2023-09-19
Payer: COMMERCIAL

## 2023-09-19 DIAGNOSIS — N20.0 KIDNEY STONES: Primary | ICD-10-CM

## 2023-09-19 NOTE — TELEPHONE ENCOUNTER
Caller: Jennifer Francis    Relationship to patient: Self    Best call back number: [phone]    Patient is needing: PT WANTS TO LET DOCTOR KNOW THAT SHE STILL HAS KIDNEY STONE AND HAS NOT PASSED IT, WAS TOLD TO CALL IN BECAUSE SHE MAY NEED SURGERY    OK TO LEAVE VM

## 2023-09-19 NOTE — TELEPHONE ENCOUNTER
Spoke with patient informing her to go to [facility] or the hospital to get a KUB xray. We will call her with those results.

## 2023-09-21 ENCOUNTER — HOSPITAL ENCOUNTER (OUTPATIENT)
Dept: GENERAL RADIOLOGY | Facility: HOSPITAL | Age: 23
Discharge: HOME | End: 2023-09-21
Admitting: UROLOGY
Payer: COMMERCIAL

## 2023-09-21 DIAGNOSIS — N20.0 KIDNEY STONES: ICD-10-CM

## 2023-09-21 PROCEDURE — 74018 RADEX ABDOMEN 1 VIEW: CPT

## 2024-04-24 ENCOUNTER — HOSPITAL ENCOUNTER (EMERGENCY)
Facility: HOSPITAL | Age: 24
Discharge: HOME | End: 2024-04-25
Attending: EMERGENCY MEDICINE
Payer: MEDICAID

## 2024-04-24 ENCOUNTER — APPOINTMENT (OUTPATIENT)
Dept: ULTRASOUND IMAGING | Facility: HOSPITAL | Age: 24
End: 2024-04-24
Payer: MEDICAID

## 2024-04-24 DIAGNOSIS — O20.0 THREATENED MISCARRIAGE IN EARLY PREGNANCY: Primary | ICD-10-CM

## 2024-04-24 DIAGNOSIS — R10.13 EPIGASTRIC PAIN: ICD-10-CM

## 2024-04-24 LAB
ALBUMIN SERPL-MCNC: 4 G/DL (ref 3.5–5.2)
ALBUMIN/GLOB SERPL: 1.4 G/DL
ALP SERPL-CCNC: 74 U/L (ref 39–117)
ALT SERPL W P-5'-P-CCNC: 10 U/L (ref 1–33)
ANION GAP SERPL CALCULATED.3IONS-SCNC: 12 MMOL/L (ref 5–15)
AST SERPL-CCNC: 15 U/L (ref 1–32)
BASOPHILS # BLD AUTO: 0.05 10*3/MM3 (ref 0–0.2)
BASOPHILS NFR BLD AUTO: 0.5 % (ref 0–1.5)
BILIRUB SERPL-MCNC: 0.2 MG/DL (ref 0–1.2)
BUN SERPL-MCNC: 10 MG/DL (ref 6–20)
BUN/CREAT SERPL: 12.7 (ref 7–25)
CALCIUM SPEC-SCNC: 9 MG/DL (ref 8.6–10.5)
CHLORIDE SERPL-SCNC: 103 MMOL/L (ref 98–107)
CO2 SERPL-SCNC: 22 MMOL/L (ref 22–29)
CREAT SERPL-MCNC: 0.79 MG/DL (ref 0.57–1)
DEPRECATED RDW RBC AUTO: 39.4 FL (ref 37–54)
EGFRCR SERPLBLD CKD-EPI 2021: 108.6 ML/MIN/1.73
EOSINOPHIL # BLD AUTO: 0.03 10*3/MM3 (ref 0–0.4)
EOSINOPHIL NFR BLD AUTO: 0.3 % (ref 0.3–6.2)
ERYTHROCYTE [DISTWIDTH] IN BLOOD BY AUTOMATED COUNT: 12.1 % (ref 12.3–15.4)
GLOBULIN UR ELPH-MCNC: 2.9 GM/DL
GLUCOSE SERPL-MCNC: 99 MG/DL (ref 65–99)
HCG INTACT+B SERPL-ACNC: 217.3 MIU/ML
HCT VFR BLD AUTO: 39.7 % (ref 34–46.6)
HGB BLD-MCNC: 12.8 G/DL (ref 12–15.9)
IMM GRANULOCYTES # BLD AUTO: 0.03 10*3/MM3 (ref 0–0.05)
IMM GRANULOCYTES NFR BLD AUTO: 0.3 % (ref 0–0.5)
LYMPHOCYTES # BLD AUTO: 2.13 10*3/MM3 (ref 0.7–3.1)
LYMPHOCYTES NFR BLD AUTO: 20.5 % (ref 19.6–45.3)
MCH RBC QN AUTO: 28.6 PG (ref 26.6–33)
MCHC RBC AUTO-ENTMCNC: 32.2 G/DL (ref 31.5–35.7)
MCV RBC AUTO: 88.6 FL (ref 79–97)
MONOCYTES # BLD AUTO: 0.76 10*3/MM3 (ref 0.1–0.9)
MONOCYTES NFR BLD AUTO: 7.3 % (ref 5–12)
NEUTROPHILS NFR BLD AUTO: 7.38 10*3/MM3 (ref 1.7–7)
NEUTROPHILS NFR BLD AUTO: 71.1 % (ref 42.7–76)
NRBC BLD AUTO-RTO: 0 /100 WBC (ref 0–0.2)
PLATELET # BLD AUTO: 325 10*3/MM3 (ref 140–450)
PMV BLD AUTO: 9 FL (ref 6–12)
POTASSIUM SERPL-SCNC: 3.7 MMOL/L (ref 3.5–5.2)
PROT SERPL-MCNC: 6.9 G/DL (ref 6–8.5)
RBC # BLD AUTO: 4.48 10*6/MM3 (ref 3.77–5.28)
SODIUM SERPL-SCNC: 137 MMOL/L (ref 136–145)
WBC NRBC COR # BLD AUTO: 10.38 10*3/MM3 (ref 3.4–10.8)

## 2024-04-24 PROCEDURE — 85025 COMPLETE CBC W/AUTO DIFF WBC: CPT

## 2024-04-24 PROCEDURE — 25010000002 ONDANSETRON PER 1 MG

## 2024-04-24 PROCEDURE — 25010000002 KETOROLAC TROMETHAMINE PER 15 MG

## 2024-04-24 PROCEDURE — 93975 VASCULAR STUDY: CPT

## 2024-04-24 PROCEDURE — 80053 COMPREHEN METABOLIC PANEL: CPT

## 2024-04-24 PROCEDURE — 96374 THER/PROPH/DIAG INJ IV PUSH: CPT

## 2024-04-24 PROCEDURE — 99284 EMERGENCY DEPT VISIT MOD MDM: CPT

## 2024-04-24 PROCEDURE — 84702 CHORIONIC GONADOTROPIN TEST: CPT | Performed by: EMERGENCY MEDICINE

## 2024-04-24 PROCEDURE — 76830 TRANSVAGINAL US NON-OB: CPT

## 2024-04-24 PROCEDURE — 36415 COLL VENOUS BLD VENIPUNCTURE: CPT | Performed by: EMERGENCY MEDICINE

## 2024-04-24 PROCEDURE — 25810000003 SODIUM CHLORIDE 0.9 % SOLUTION

## 2024-04-24 PROCEDURE — 96375 TX/PRO/DX INJ NEW DRUG ADDON: CPT

## 2024-04-24 RX ORDER — ONDANSETRON 2 MG/ML
4 INJECTION INTRAMUSCULAR; INTRAVENOUS ONCE
Status: COMPLETED | OUTPATIENT
Start: 2024-04-24 | End: 2024-04-24

## 2024-04-24 RX ORDER — KETOROLAC TROMETHAMINE 30 MG/ML
30 INJECTION, SOLUTION INTRAMUSCULAR; INTRAVENOUS ONCE
Status: COMPLETED | OUTPATIENT
Start: 2024-04-24 | End: 2024-04-24

## 2024-04-24 RX ADMIN — KETOROLAC TROMETHAMINE 30 MG: 30 INJECTION, SOLUTION INTRAMUSCULAR; INTRAVENOUS at 23:17

## 2024-04-24 RX ADMIN — ONDANSETRON 4 MG: 2 INJECTION INTRAMUSCULAR; INTRAVENOUS at 23:17

## 2024-04-24 RX ADMIN — SODIUM CHLORIDE 1000 ML: 9 INJECTION, SOLUTION INTRAVENOUS at 23:17

## 2024-04-24 NOTE — ED PROVIDER NOTES
Time: 7:39 PM EDT  Date of encounter:  4/24/2024  Independent Historian/Clinical History and Information was obtained by:   Patient    History is limited by: N/A    Chief Complaint: Vaginal bleeding      History of Present Illness:  Patient is a 23 y.o. year old female who presents to the emergency department for evaluation of vaginal bleeding and left pelvic pain.  Clotting started today.  She saw her OB/GYN recently and had a negative pregnancy test.  LMP was March 1.  Endorses nausea but denies vomiting, diarrhea.  Denies fever or dysuria.    HPI    Patient Care Team  Primary Care Provider: Mey Manzanares APRN    Past Medical History:     Allergies   Allergen Reactions    Coconut Unknown - Low Severity     History reviewed. No pertinent past medical history.  Past Surgical History:   Procedure Laterality Date    ANKLE SURGERY Right      Family History   Problem Relation Age of Onset    Hypertension Father     Breast cancer Neg Hx     Ovarian cancer Neg Hx     Uterine cancer Neg Hx     Colon cancer Neg Hx     Diabetes Neg Hx        Home Medications:  Prior to Admission medications    Medication Sig Start Date End Date Taking? Authorizing Provider   naproxen (NAPROSYN) 500 MG tablet Take 1 tablet by mouth 2 (Two) Times a Day With Meals. 9/7/23   Mirna Edmonds APRN   norelgestromin-ethinyl estradiol (Xulane) 150-35 MCG/24HR Place 1 patch on the skin as directed by provider 1 (One) Time Per Week. Apply one patch weekly for 3 weeks, leave off one week to have cycle, then repeat.  Patient not taking: Reported on 9/11/2023 7/20/23   Wilver Forrester APRN   ondansetron ODT (ZOFRAN-ODT) 4 MG disintegrating tablet Place 1 tablet on the tongue Every 4 (Four) Hours As Needed for Nausea for up to 15 doses. 9/7/23   Mirna Edmonds APRN   phentermine (ADIPEX-P) 37.5 MG tablet Take 1 tablet by mouth Every Morning Before Breakfast. 8/15/23   Provider, MD Michael   tamsulosin (Flomax) 0.4 MG capsule 24 hr capsule Take 1  "capsule by mouth Daily. Discontinue if stone passes or lightheaded when upright. 9/7/23   KendalLaminDUNIA capellan        Social History:   Social History     Tobacco Use    Smoking status: Never    Smokeless tobacco: Never   Vaping Use    Vaping status: Every Day    Substances: Nicotine, Flavoring   Substance Use Topics    Alcohol use: Never    Drug use: Never         Review of Systems:  Review of Systems   Constitutional: Negative.    HENT: Negative.     Eyes: Negative.    Respiratory: Negative.     Cardiovascular: Negative.    Gastrointestinal: Negative.    Endocrine: Negative.    Genitourinary:  Positive for menstrual problem, pelvic pain and vaginal bleeding.   Musculoskeletal: Negative.    Skin: Negative.    Allergic/Immunologic: Negative.    Neurological: Negative.    Hematological: Negative.    Psychiatric/Behavioral: Negative.          Physical Exam:  /95 (BP Location: Left arm, Patient Position: Sitting)   Pulse 79   Temp 98.8 °F (37.1 °C) (Oral)   Resp 18   Ht 157.5 cm (62\")   Wt 93.7 kg (206 lb 9.1 oz)   LMP  (LMP Unknown)   SpO2 100%   BMI 37.78 kg/m²     Physical Exam  Vitals and nursing note reviewed.   Constitutional:       General: She is not in acute distress.     Appearance: Normal appearance. She is not toxic-appearing.   HENT:      Head: Normocephalic and atraumatic.      Jaw: There is normal jaw occlusion.      Mouth/Throat:      Mouth: Mucous membranes are moist.   Eyes:      General: Lids are normal.      Extraocular Movements: Extraocular movements intact.      Conjunctiva/sclera: Conjunctivae normal.      Pupils: Pupils are equal, round, and reactive to light.   Cardiovascular:      Rate and Rhythm: Normal rate and regular rhythm.      Pulses: Normal pulses.      Heart sounds: Normal heart sounds.   Pulmonary:      Effort: Pulmonary effort is normal. No respiratory distress.      Breath sounds: Normal breath sounds. No wheezing or rhonchi.   Abdominal:      General: Abdomen is flat. " Bowel sounds are normal. There is no distension.      Palpations: Abdomen is soft.      Tenderness: There is abdominal tenderness in the suprapubic area and left lower quadrant. There is no right CVA tenderness, left CVA tenderness, guarding or rebound.   Musculoskeletal:         General: Normal range of motion.      Cervical back: Normal range of motion and neck supple.      Right lower leg: No edema.      Left lower leg: No edema.   Skin:     General: Skin is warm and dry.   Neurological:      General: No focal deficit present.      Mental Status: She is alert and oriented to person, place, and time. Mental status is at baseline.   Psychiatric:         Mood and Affect: Mood normal.         Behavior: Behavior normal.               Procedures:  Procedures      Medical Decision Making:      Comorbidities that affect care:    None    External Notes reviewed:    Previous Clinic Note: Patient was seen on 4/16/2024 by Mey Manzanares for routine screening Pap smear.      The following orders were placed and all results were independently analyzed by me:  Orders Placed This Encounter   Procedures    US TRANSVAGINAL NON-OB W DOPPLER    hCG, Quantitative, Pregnancy    Comprehensive Metabolic Panel    CBC Auto Differential    CBC & Differential       Medications Given in the Emergency Department:  Medications   sodium chloride 0.9 % bolus 1,000 mL (1,000 mL Intravenous New Bag 4/24/24 2317)   ketorolac (TORADOL) injection 30 mg (30 mg Intravenous Given 4/24/24 2317)   ondansetron (ZOFRAN) injection 4 mg (4 mg Intravenous Given 4/24/24 2317)        ED Course:    ED Course as of 04/25/24 0034 Wed Apr 24, 2024   1940 --- PROVIDER IN TRIAGE NOTE ---    The patient was evaluated by meCamden in triage. Orders were placed and the patient is currently awaiting disposition.    [AJ]      ED Course User Index  [AJ] Camden Cronin PA-C       Labs:    Lab Results (last 24 hours)       Procedure Component Value Units  Date/Time    hCG, Quantitative, Pregnancy [[id number]] Collected: 04/24/24 1947    Specimen: Blood from Arm, Right Updated: 04/24/24 2031     HCG Quantitative 217.30 mIU/mL     Narrative:      HCG Ranges by Gestational Age    Females - non-pregnant premenopausal   </= 1mIU/mL HCG  Females - postmenopausal               </= 7mIU/mL HCG    3 Weeks       5.4   -      72 mIU/mL  4 Weeks      10.2   -     708 mIU/mL  5 Weeks       217   -   8,245 mIU/mL  6 Weeks       152   -  32,177 mIU/mL  7 Weeks     4,059   - 153,767 mIU/mL  8 Weeks    31,366   - 149,094 mIU/mL  9 Weeks    59,109   - 135,901 mIU/mL  10 Weeks   44,186   - 170,409 mIU/mL  12 Weeks   27,107   - 201,615 mIU/mL  14 Weeks   24,302   -  93,646 mIU/mL  15 Weeks   12,540   -  69,747 mIU/mL  16 Weeks    8,904   -  55,332 mIU/mL  17 Weeks    8,240   -  51,793 mIU/mL  18 Weeks    9,649   -  55,271 mIU/mL      CBC & Differential [[id number]]  (Abnormal) Collected: 04/24/24 1947    Specimen: Blood from Arm, Right Updated: 04/24/24 2007    Narrative:      The following orders were created for panel order CBC & Differential.  Procedure                               Abnormality         Status                     ---------                               -----------         ------                     CBC Auto Differential[[id number]]        Abnormal            Final result                 Please view results for these tests on the individual orders.    Comprehensive Metabolic Panel [[id number]] Collected: 04/24/24 1947    Specimen: Blood from Arm, Right Updated: 04/24/24 2033     Glucose 99 mg/dL      BUN 10 mg/dL      Creatinine 0.79 mg/dL      Sodium 137 mmol/L      Potassium 3.7 mmol/L      Chloride 103 mmol/L      CO2 22.0 mmol/L      Calcium 9.0 mg/dL      Total Protein 6.9 g/dL      Albumin 4.0 g/dL      ALT (SGPT) 10 U/L      AST (SGOT) 15 U/L      Alkaline Phosphatase 74 U/L      Total Bilirubin 0.2 mg/dL      Globulin 2.9 gm/dL      A/G Ratio 1.4 g/dL       BUN/Creatinine Ratio 12.7     Anion Gap 12.0 mmol/L      eGFR 108.6 mL/min/1.73     Narrative:      GFR Normal >60  Chronic Kidney Disease <60  Kidney Failure <15      CBC Auto Differential [[id number]]  (Abnormal) Collected: 04/24/24 1947    Specimen: Blood from Arm, Right Updated: 04/24/24 2007     WBC 10.38 10*3/mm3      RBC 4.48 10*6/mm3      Hemoglobin 12.8 g/dL      Hematocrit 39.7 %      MCV 88.6 fL      MCH 28.6 pg      MCHC 32.2 g/dL      RDW 12.1 %      RDW-SD 39.4 fl      MPV 9.0 fL      Platelets 325 10*3/mm3      Neutrophil % 71.1 %      Lymphocyte % 20.5 %      Monocyte % 7.3 %      Eosinophil % 0.3 %      Basophil % 0.5 %      Immature Grans % 0.3 %      Neutrophils, Absolute 7.38 10*3/mm3      Lymphocytes, Absolute 2.13 10*3/mm3      Monocytes, Absolute 0.76 10*3/mm3      Eosinophils, Absolute 0.03 10*3/mm3      Basophils, Absolute 0.05 10*3/mm3      Immature Grans, Absolute 0.03 10*3/mm3      nRBC 0.0 /100 WBC              Imaging:    US TRANSVAGINAL NON-OB W DOPPLER    Result Date: 4/24/2024  US TRANSVAGINAL NON-OB WITH DOPPLER-  DATE OF EXAM: 4/24/2024 7:54 PM  INDICATION: vaginal bleeding  COMPARISON: None.  TECHNIQUE: Transvaginal pelvic ultrasound was performed.  FINDINGS: The uterus measures 4.9 cm in length. The uterus measures 4.4 cm x 2.8 cm in transverse and AP dimension. The myometrium is uniformly echoic. The endometrium is uniformly echoic. The endometrial bilayer measures 6 mm.  The right ovary measures 3 cm in greatest dimension, with a volume of 7.1 cc.  The left ovary measures 3.6 cm, with a volume of 9.5 cc.  Small ovarian cysts or follicles are seen.      Transvaginal pelvic ultrasound, as above.  Electronically Signed By-DIAMOND ALBERT MD On:4/24/2024 8:59 PM         Differential Diagnosis and Discussion:    Vaginal Bleeding: Differential diagnosis includes but is not limited to foreign body, tumor, vaginitis, dysfunctional uterine bleeding, endocrine abnormalities, coagulation  disorder, systemic illness, polyps, complications of pregnancy (possible ectopic pregnancy).    All labs were reviewed and interpreted by me.  Ultrasound impression was interpreted by me.     MDM  Number of Diagnoses or Management Options  Epigastric pain  Threatened miscarriage in early pregnancy  Diagnosis management comments: The patient presents with vaginal bleeding. Possible etiology of vaginal bleeding were considered, but not limited to; ectopic pregnancy, spontaneous miscarriage, gestational trophoblastic disease, implantation bleeding, molar pregnancy, disfunctional uterine bleeding, and fibroids. The patient is well appearing and resting comfortably. There are no indications for transfusion. After careful consideration the patient is safe and stable to be discharged home with an outpatient OB/GYN follow-up. Patient was counseled to return to the ER or follow-up with their OB/GYN in 48 hours especially for worsening of her bleeding or increased pain. The patient has expressed a clear and thorough understanding and his agreed to follow-up as instructed.  No products of conception were visualized while in the ED.  Patient verbalized understanding of strict return precautions including new or worsening abdominal pain, fever, increased vaginal bleeding.       Amount and/or Complexity of Data Reviewed  Clinical lab tests: reviewed  Tests in the radiology section of CPT®: reviewed  Decide to obtain previous medical records or to obtain history from someone other than the patient: yes         Patient Care Considerations:    SEPSIS was considered but is NOT present in the emergency department as SIRS criteria is not present. ANTIBIOTICS: I considered prescribing antibiotics as an outpatient however no bacterial focus of infection was found.      Consultants/Shared Management Plan:    None    Social Determinants of Health:    Patient is independent, reliable, and has access to care.       Disposition and Care  Coordination:    Discharged: The patient is suitable and stable for discharge with no need for consideration of admission.    I have explained the patient´s condition, diagnoses and treatment plan based on the information available to me at this time. I have answered questions and addressed any concerns. The patient has a good  understanding of the patient´s diagnosis, condition, and treatment plan as can be expected at this point. The vital signs have been stable. The patient´s condition is stable and appropriate for discharge from the emergency department.      The patient will pursue further outpatient evaluation with the primary care physician or other designated or consulting physician as outlined in the discharge instructions. They are agreeable to this plan of care and follow-up instructions have been explained in detail. The patient has received these instructions in written format and has expressed an understanding of the discharge instructions. The patient is aware that any significant change in condition or worsening of symptoms should prompt an immediate return to this or the closest emergency department or call to 911.    Final diagnoses:   Threatened miscarriage in early pregnancy   Epigastric pain        ED Disposition       ED Disposition   Discharge    Condition   Stable    Comment   --               This medical record created using voice recognition software.             Nella Calle, DUNIA  04/25/24 0035

## 2024-04-24 NOTE — Clinical Note
[facility] EMERGENCY ROOM  [address] [place] [address]  Phone: [phone]  Fax: [phone]    Jennifer Francis was seen and treated in our emergency department on 4/24/2024.  She may return to work on 04/29/2024.         Thank you for choosing [facility].    Nella Calle APRN

## 2024-04-25 ENCOUNTER — TELEPHONE (OUTPATIENT)
Dept: OBSTETRICS AND GYNECOLOGY | Facility: CLINIC | Age: 24
End: 2024-04-25
Payer: MEDICAID

## 2024-04-25 VITALS
OXYGEN SATURATION: 97 % | HEART RATE: 79 BPM | HEIGHT: 62 IN | TEMPERATURE: 98.8 F | BODY MASS INDEX: 38.01 KG/M2 | DIASTOLIC BLOOD PRESSURE: 95 MMHG | SYSTOLIC BLOOD PRESSURE: 148 MMHG | WEIGHT: 206.57 LBS | RESPIRATION RATE: 18 BRPM

## 2024-04-25 RX ORDER — ONDANSETRON 4 MG/1
4 TABLET, ORALLY DISINTEGRATING ORAL EVERY 8 HOURS PRN
Qty: 15 TABLET | Refills: 0 | Status: SHIPPED | OUTPATIENT
Start: 2024-04-25 | End: 2024-04-30

## 2024-04-25 NOTE — ED PROVIDER NOTES
"Patient is 23 y.o. year old female that presents to the ED for evaluation of vaginal bleeding.     Physical Exam    ED Course:    /95 (BP Location: Left arm, Patient Position: Sitting)   Pulse 79   Temp 98.8 °F (37.1 °C) (Oral)   Resp 18   Ht 157.5 cm (62\")   Wt 93.7 kg (206 lb 9.1 oz)   LMP  (LMP Unknown)   SpO2 97%   BMI 37.78 kg/m²   Results for orders placed or performed during the hospital encounter of 04/24/24   hCG, Quantitative, Pregnancy    Specimen: Arm, Right; Blood   Result Value Ref Range    HCG Quantitative 217.30 mIU/mL   Comprehensive Metabolic Panel    Specimen: Arm, Right; Blood   Result Value Ref Range    Glucose 99 65 - 99 mg/dL    BUN 10 6 - 20 mg/dL    Creatinine 0.79 0.57 - 1.00 mg/dL    Sodium 137 136 - 145 mmol/L    Potassium 3.7 3.5 - 5.2 mmol/L    Chloride 103 98 - 107 mmol/L    CO2 22.0 22.0 - 29.0 mmol/L    Calcium 9.0 8.6 - 10.5 mg/dL    Total Protein 6.9 6.0 - 8.5 g/dL    Albumin 4.0 3.5 - 5.2 g/dL    ALT (SGPT) 10 1 - 33 U/L    AST (SGOT) 15 1 - 32 U/L    Alkaline Phosphatase 74 39 - 117 U/L    Total Bilirubin 0.2 0.0 - 1.2 mg/dL    Globulin 2.9 gm/dL    A/G Ratio 1.4 g/dL    BUN/Creatinine Ratio 12.7 7.0 - 25.0    Anion Gap 12.0 5.0 - 15.0 mmol/L    eGFR 108.6 >60.0 mL/min/1.73   CBC Auto Differential    Specimen: Arm, Right; Blood   Result Value Ref Range    WBC 10.38 3.40 - 10.80 10*3/mm3    RBC 4.48 3.77 - 5.28 10*6/mm3    Hemoglobin 12.8 12.0 - 15.9 g/dL    Hematocrit 39.7 34.0 - 46.6 %    MCV 88.6 79.0 - 97.0 fL    MCH 28.6 26.6 - 33.0 pg    MCHC 32.2 31.5 - 35.7 g/dL    RDW 12.1 (L) 12.3 - 15.4 %    RDW-SD 39.4 37.0 - 54.0 fl    MPV 9.0 6.0 - 12.0 fL    Platelets 325 140 - 450 10*3/mm3    Neutrophil % 71.1 42.7 - 76.0 %    Lymphocyte % 20.5 19.6 - 45.3 %    Monocyte % 7.3 5.0 - 12.0 %    Eosinophil % 0.3 0.3 - 6.2 %    Basophil % 0.5 0.0 - 1.5 %    Immature Grans % 0.3 0.0 - 0.5 %    Neutrophils, Absolute 7.38 (H) 1.70 - 7.00 10*3/mm3    Lymphocytes, Absolute 2.13 " 0.70 - 3.10 10*3/mm3    Monocytes, Absolute 0.76 0.10 - 0.90 10*3/mm3    Eosinophils, Absolute 0.03 0.00 - 0.40 10*3/mm3    Basophils, Absolute 0.05 0.00 - 0.20 10*3/mm3    Immature Grans, Absolute 0.03 0.00 - 0.05 10*3/mm3    nRBC 0.0 0.0 - 0.2 /100 WBC     Medications   sodium chloride 0.9 % bolus 1,000 mL (0 mL Intravenous Stopped 4/25/24 0039)   ketorolac (TORADOL) injection 30 mg (30 mg Intravenous Given 4/24/24 2317)   ondansetron (ZOFRAN) injection 4 mg (4 mg Intravenous Given 4/24/24 2317)     US TRANSVAGINAL NON-OB W DOPPLER    Result Date: 4/24/2024  Narrative: US TRANSVAGINAL NON-OB WITH DOPPLER-  DATE OF EXAM: 4/24/2024 7:54 PM  INDICATION: vaginal bleeding  COMPARISON: None.  TECHNIQUE: Transvaginal pelvic ultrasound was performed.  FINDINGS: The uterus measures 4.9 cm in length. The uterus measures 4.4 cm x 2.8 cm in transverse and AP dimension. The myometrium is uniformly echoic. The endometrium is uniformly echoic. The endometrial bilayer measures 6 mm.  The right ovary measures 3 cm in greatest dimension, with a volume of 7.1 cc.  The left ovary measures 3.6 cm, with a volume of 9.5 cc.  Small ovarian cysts or follicles are seen.      Impression: Transvaginal pelvic ultrasound, as above.  Electronically Signed By-DIAMOND ALBERT MD On:4/24/2024 8:59 PM       MDM:      I have seen and evaluated this patient and agree with the nurse practitioner or physician assistant´s documentation and assessment. All charts, labs, and imaging studies were reviewed. Documentation of one or more elements of my assessment included in the medical record. I agree with findings, exam, and plan.    Disposition:   ED Disposition       ED Disposition   Discharge    Condition   Stable    Comment   --                 Clincal Impression: Threatened miscarriage in early pregnancy    Waqas Cortes MD  07:16 EDT  04/25/24         Waqas Cortes MD  04/25/24 0716

## 2024-04-25 NOTE — DISCHARGE INSTRUCTIONS
Home to rest.  Increase your fluid intake.  Call your women's health provider to follow-up within the next 48 hours for a repeat blood test to check your hCG levels.  If you experience new or worsening abdominal pain, nausea, vomiting, vaginal bleeding, fever please return to the ED promptly.    You may take Tylenol for pain relief.

## 2024-04-25 NOTE — TELEPHONE ENCOUNTER
Patient called was seen in ER last night due to Threatened miscarriage was advised to be seen with in 24-72 hours with OB/Gyn clinic patient can not be seen today in clinic due to work schedule can come in clinic tomorrow before 3:00 advised patient we will contact her back today once her ER visit has been reviewed and we will go from there.

## 2024-04-25 NOTE — PROGRESS NOTES
"GYN Visit    Chief Complaint   Patient presents with    Follow-up       HPI:   23 y.o. LMP: Patient's last menstrual period was 2024 (exact date).     Social History     Substance and Sexual Activity   Sexual Activity Yes    Partners: Male    Birth control/protection: None     New pt to me    US TRANSVAGINAL NON-OB W DOPPLER (2024 20:15)  CBC & Differential (2024 19:47)  HCG, Quantitative, Pregnancy (Hospital Lab Only) (2024 12:24)  ED Provider Notes by Waqas Cortes MD (2024 23:02)    Seen in the emergency room  for vaginal bleeding    LMP ~1Mar - not completely sure, menses were regular, on no BC  Should be 8+0 today  BHCG dropped from 217 on  to 80 today    Bleeding started 23April w WWE at PCP and Upreg test pos, started bleeding and passed tissue (pics on phone, c/w POC).  Pain severe when passing tissue. No pain now, just soreness.  VB changes q 2-4hrs.      Mom uses effexor, would like to try meds, feeling down.  Has good support at home.    Blood type AB + by report    US today EMS 5.7mm, no IUP, o/w wnl     History: PMHx, Meds, Allergies, PSHx, Social Hx, and POBHx all reviewed and updated.    PHYSICAL EXAM:  /83   Pulse 80   Ht 157.5 cm (62.01\")   Wt 94.7 kg (208 lb 12.8 oz)   LMP 2024 (Exact Date)   BMI 38.18 kg/m²   Facility age limit for growth %samantha is 20 years.   General- NAD, alert and oriented, appropriate  Psych- Normal mood, good memory    Chaperone present during breast and/or pelvic exam if performed.  External genitalia- Normal female, no lesions  Urethra/meatus- Normal, no masses, non tender  Bladder- Normal, no masses, non tender  Vagina- Normal, no atrophy, no lesions, no discharge   Prolapse : none noted, not examined with split speculum to delineate  Cervix- Normal, no lesions, no discharge, No cervical motion tenderness, small amount of vag blood- old maroon blood, no active VB  Uterus- Normal size, shape & consistency.  Non " tender, mobile.  Adnexa- No mass, non tender    Lymphatic- No palpable groin nodes  Extremities- No edema, no cyanosis    Skin- No lesions, no rashes      ASSESSMENT AND PLAN:  Diagnoses and all orders for this visit:    1. SAB (spontaneous ) (Primary)  Overview:  Completed  2024 8weeks by LMP, SAB, no D+C    Orders:  -     hCG, Quantitative, Pregnancy; Standing    2. Anxiety and depression  Comments:  Assoc w loss, desires meds.  No SI or HI.  Has good support.  Declines therapist.  Orders:  -     venlafaxine XR (EFFEXOR-XR) 37.5 MG 24 hr capsule; Take 1 capsule by mouth Daily.  Dispense: 30 capsule; Refill: 2    Note for work off for labs, declines off work now for loss, option off work 2 weeks, Lucy's POC provided prn need.    Declines BC    Bleeding and pain precautions    Will plan following beta-hCG regularly until negative    We reviewed potential etiologies of miscarriage and loss, her questions and her mother's questions were answered to their satisfaction.      Follow Up:  Return if symptoms worsen or fail to improve.          Kesha Billy DO  2024    [facility]  [address] [place] [address]  Dept: [phone]  Dept Fax: [phone]  Loc: [phone]  Loc Fax: [phone]

## 2024-04-25 NOTE — TELEPHONE ENCOUNTER
Please let patient know we are working on scheduling her for follow up with one of the physicians.

## 2024-04-26 ENCOUNTER — TELEPHONE (OUTPATIENT)
Dept: OBSTETRICS AND GYNECOLOGY | Facility: CLINIC | Age: 24
End: 2024-04-26

## 2024-04-26 ENCOUNTER — LAB (OUTPATIENT)
Dept: LAB | Facility: HOSPITAL | Age: 24
End: 2024-04-26
Payer: MEDICAID

## 2024-04-26 ENCOUNTER — OFFICE VISIT (OUTPATIENT)
Dept: OBSTETRICS AND GYNECOLOGY | Facility: CLINIC | Age: 24
End: 2024-04-26
Payer: MEDICAID

## 2024-04-26 VITALS
DIASTOLIC BLOOD PRESSURE: 83 MMHG | SYSTOLIC BLOOD PRESSURE: 118 MMHG | HEART RATE: 80 BPM | BODY MASS INDEX: 38.42 KG/M2 | WEIGHT: 208.8 LBS | HEIGHT: 62 IN

## 2024-04-26 DIAGNOSIS — O20.0 THREATENED ABORTION: Primary | ICD-10-CM

## 2024-04-26 DIAGNOSIS — O20.0 THREATENED ABORTION: ICD-10-CM

## 2024-04-26 DIAGNOSIS — O03.9 SAB (SPONTANEOUS ABORTION): Primary | ICD-10-CM

## 2024-04-26 DIAGNOSIS — F41.9 ANXIETY: ICD-10-CM

## 2024-04-26 LAB — HCG INTACT+B SERPL-ACNC: 80.06 MIU/ML

## 2024-04-26 PROCEDURE — 36415 COLL VENOUS BLD VENIPUNCTURE: CPT

## 2024-04-26 PROCEDURE — 84702 CHORIONIC GONADOTROPIN TEST: CPT

## 2024-04-26 RX ORDER — VENLAFAXINE HYDROCHLORIDE 37.5 MG/1
37.5 CAPSULE, EXTENDED RELEASE ORAL DAILY
Qty: 30 CAPSULE | Refills: 2 | Status: SHIPPED | OUTPATIENT
Start: 2024-04-26

## 2024-04-26 NOTE — TELEPHONE ENCOUNTER
Called patient to let her know we need a STAT BHCG done this morning so that we will have the results back by the time she gets her u/s. Had to leave a message on voice mail.    

## 2024-05-03 ENCOUNTER — LAB (OUTPATIENT)
Dept: OBSTETRICS AND GYNECOLOGY | Facility: CLINIC | Age: 24
End: 2024-05-03
Payer: MEDICAID

## 2024-05-03 DIAGNOSIS — O03.9 SAB (SPONTANEOUS ABORTION): ICD-10-CM

## 2024-05-03 LAB — HCG INTACT+B SERPL-ACNC: 4.56 MIU/ML

## 2024-05-03 PROCEDURE — 84702 CHORIONIC GONADOTROPIN TEST: CPT | Performed by: OBSTETRICS & GYNECOLOGY

## 2024-05-06 ENCOUNTER — TELEPHONE (OUTPATIENT)
Dept: OBSTETRICS AND GYNECOLOGY | Facility: CLINIC | Age: 24
End: 2024-05-06
Payer: MEDICAID

## 2024-05-06 DIAGNOSIS — Z34.90 PREGNANCY WITH FETUS OF UNKNOWN GESTATIONAL AGE: Primary | ICD-10-CM

## 2024-05-13 ENCOUNTER — LAB (OUTPATIENT)
Dept: OBSTETRICS AND GYNECOLOGY | Facility: CLINIC | Age: 24
End: 2024-05-13
Payer: MEDICAID

## 2024-05-13 DIAGNOSIS — O03.9 SAB (SPONTANEOUS ABORTION): ICD-10-CM

## 2024-05-13 LAB — HCG INTACT+B SERPL-ACNC: <1 MIU/ML

## 2024-05-13 PROCEDURE — 36415 COLL VENOUS BLD VENIPUNCTURE: CPT | Performed by: OBSTETRICS & GYNECOLOGY

## 2024-05-13 PROCEDURE — 84702 CHORIONIC GONADOTROPIN TEST: CPT | Performed by: OBSTETRICS & GYNECOLOGY

## 2024-07-24 ENCOUNTER — TELEPHONE (OUTPATIENT)
Dept: OBSTETRICS AND GYNECOLOGY | Facility: CLINIC | Age: 24
End: 2024-07-24
Payer: MEDICAID

## 2024-07-24 DIAGNOSIS — Z32.01 POSITIVE PREGNANCY TEST: Primary | ICD-10-CM

## 2024-07-24 NOTE — TELEPHONE ENCOUNTER
Patient is established with the office with + UPT x3 (most recent 7/24 am). Her LMP was 6/20 and denies current issues. She did have a recent loss in Apr/May 2024. She is scheduled for GynFU 8/5. Any recommendations?

## 2024-07-24 NOTE — TELEPHONE ENCOUNTER
Patient informed of recommendations. Appointment updated with ultrasound appointment added. Precautions understood.

## 2024-07-26 ENCOUNTER — LAB (OUTPATIENT)
Dept: OBSTETRICS AND GYNECOLOGY | Facility: CLINIC | Age: 24
End: 2024-07-26
Payer: MEDICAID

## 2024-07-26 DIAGNOSIS — Z32.01 POSITIVE PREGNANCY TEST: ICD-10-CM

## 2024-07-26 LAB — HCG INTACT+B SERPL-ACNC: 3290 MIU/ML

## 2024-07-26 PROCEDURE — 84702 CHORIONIC GONADOTROPIN TEST: CPT | Performed by: OBSTETRICS & GYNECOLOGY
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
## 2024-08-05 ENCOUNTER — OFFICE VISIT (OUTPATIENT)
Dept: OBSTETRICS AND GYNECOLOGY | Facility: CLINIC | Age: 24
End: 2024-08-05
Payer: MEDICAID

## 2024-08-05 VITALS
HEART RATE: 79 BPM | WEIGHT: 213.6 LBS | HEIGHT: 62 IN | BODY MASS INDEX: 39.31 KG/M2 | SYSTOLIC BLOOD PRESSURE: 116 MMHG | DIASTOLIC BLOOD PRESSURE: 76 MMHG

## 2024-08-05 DIAGNOSIS — R11.2 NAUSEA AND VOMITING, UNSPECIFIED VOMITING TYPE: ICD-10-CM

## 2024-08-05 DIAGNOSIS — Z72.89 CURRENT EVERY DAY VAPING: ICD-10-CM

## 2024-08-05 DIAGNOSIS — Z34.91 VIABLE PREGNANCY IN FIRST TRIMESTER: Primary | ICD-10-CM

## 2024-08-05 DIAGNOSIS — F41.9 ANXIETY AND DEPRESSION: ICD-10-CM

## 2024-08-05 DIAGNOSIS — F32.A ANXIETY AND DEPRESSION: ICD-10-CM

## 2024-08-05 PROBLEM — K59.04 CHRONIC IDIOPATHIC CONSTIPATION: Status: RESOLVED | Noted: 2023-07-20 | Resolved: 2024-08-05

## 2024-08-05 PROBLEM — G93.32 CHRONIC FATIGUE SYNDROME: Status: RESOLVED | Noted: 2023-07-20 | Resolved: 2024-08-05

## 2024-08-05 PROBLEM — O03.9 SAB (SPONTANEOUS ABORTION): Status: RESOLVED | Noted: 2024-04-26 | Resolved: 2024-08-05

## 2024-08-05 PROBLEM — Z34.80 SUPERVISION OF OTHER NORMAL PREGNANCY, ANTEPARTUM: Status: ACTIVE | Noted: 2024-08-05

## 2024-08-05 PROCEDURE — 99214 OFFICE O/P EST MOD 30 MIN: CPT | Performed by: OBSTETRICS & GYNECOLOGY

## 2024-08-05 PROCEDURE — 1160F RVW MEDS BY RX/DR IN RCRD: CPT | Performed by: OBSTETRICS & GYNECOLOGY

## 2024-08-05 PROCEDURE — 1159F MED LIST DOCD IN RCRD: CPT | Performed by: OBSTETRICS & GYNECOLOGY

## 2024-08-05 RX ORDER — NICOTINE 21 MG/24HR
1 PATCH, TRANSDERMAL 24 HOURS TRANSDERMAL EVERY 24 HOURS
Qty: 42 PATCH | Refills: 0 | Status: SHIPPED | OUTPATIENT
Start: 2024-08-05

## 2024-08-05 RX ORDER — DIPHENHYDRAMINE HYDROCHLORIDE 25 MG/1
25 CAPSULE ORAL 2 TIMES DAILY
Qty: 60 TABLET | Refills: 1 | Status: SHIPPED | OUTPATIENT
Start: 2024-08-05

## 2024-08-05 NOTE — ASSESSMENT & PLAN NOTE
Discussed risks of vaping in pregnancy not limited to miscarriage and loss.  Chemicals in vape and nicotine can increase risk.  Patient desires nicotine patch.

## 2024-09-06 NOTE — PROGRESS NOTES
OB Initial Visit    CC- Here for care of current pregnancy, first visit  Chief Complaint   Patient presents with    Initial Prenatal Visit       Subjective:  24 y.o. presenting for her first obstetrical visit.    LMP: Patient's last menstrual period was 07/20/2024 (exact date).     US Ob < 14 Weeks Single or First Gestation (08/05/2024 11:17)  hCG, Quantitative, Pregnancy (07/26/2024 10:01)  Office Visit with Kesha Billy DO (08/05/2024)    Nicoderm 14 caused HA, wants lower dose, is cutting down.    No VB  Nausea better    Reviewed and updated:  OBHx, GYNHx (STDs), PMHx, Medications, Allergies, PSHx, Social Hx, Preventative Hx (PAP), Vaccine Hx, Genetic Hx (pt, FOB, both families).        Objective:  /71   Wt 98.8 kg (217 lb 12.8 oz)   LMP 07/20/2024 (Exact Date)   BMI 39.83 kg/m²   General- NAD, alert and oriented, appropriate  Psych- Normal mood, good memory  Neck- No masses, no thyroid enlargement  CV- Regular rhythm, no murmurs  Resp- CTA to bases, no wheezes  Abdomen- Soft, non distended, non tender, no masses    Chaperone present during breast and/or pelvic exam if performed.  Breast left- No mass, non tender, no nipple discharge, no axillary or supraclavicular nodes palpable.    Breast right- No mass, non tender, no nipple discharge, no axillary or supraclavicular nodes palpable.       External genitalia- Normal, no lesions  Urethra- Normal, no masses, non tender  Vagina- Normal, no discharge  Bladder- Normal, no masses, non tender  Cervix- Normal, no lesions, no discharge, no CMT  Uterus- Consistent with dates, Bedside US consistent with dates.  -160.   Adnexa- Normal, no mass, non tender    Lymphatic- No palpable neck, axillary, or groin nodes  Extremities- No edema, no cyanosis    Skin- No lesions, no rashes    Assessment and Plan:  Unknown   Diagnoses and all orders for this visit:    1. Supervision of other normal pregnancy, antepartum (Primary)  Overview:  BALJINDER finalized: 3/27/2025 by LMP  and 6-week ultrasound    Optional testing NIPS,CF/SMA,AFP:  Desires NIPS ordered for next week 9/9/2024 .  Declines CF 9/9/2024     COVID: Recommended 9/9/2024   Tdap:  RSV:  Flu:  Recommended 9/9/2024     Rhogam:  1hr Glucola:  FU TPA w glucola:  ? Desires Sterilization:    Anatomy US:  FU US:    PROBLEM LIST/PLAN:   Anxiety/depression-no medications.  Prepregnancy Effexor.  Stable  Daily vaping- Risks discussed vaping, recommend continue to quit.  s/p nicotine patch, decreased to 7mg patch 9/9/2024   Obesity in pregnancy-pregravid BMI 39    Orders:  -     POC Urinalysis Dipstick  -     Chlamydia trachomatis, Neisseria gonorrhoeae, PCR - Swab, Cervix  -     Urine Culture - Urine, Urine, Clean Catch  -     Urine Drug Screen - Urine, Clean Catch  -     OB Panel With HIV and RPR  -     PAP, Liquid Based ([facility] Only)  -     US Ob Detail Fetal Anatomy Single or First Gestation; Future  -     Samm Panorama Prenatal Test: Chromosomes 13, 18, 21, X & Y: Triploidy 22Q.11.2 Deletion - Blood,; Future    2. Current every day vaping  Overview:  Recommend quit    Orders:  -     nicotine (Nicoderm CQ) 7 MG/24HR patch; Place 1 patch on the skin as directed by provider Daily.  Dispense: 30 patch; Refill: 1        Counseling:   Nutrition discussed, calories, activity/exercise in pregnancy  Discussed dietary restrictions/safety food preparation in pregnancy  Reviewed what to expect prenatal visits, office providers (female and male) and covering [facility] Hospitalists/Dr. Ayala  Appropriate trimester precautions provided, N/V, vag bleeding, cramping  VACCINE importance in pregnancy discussed.  Maternal and fetal risk of not being vaccinated reviewed NLT increased risk maternal/fetal severity of illness/death, PTD, CS, hemorrhage, HTN, possible IUFD.  Significant maternal and fetal/infant benefit w vaccination.  FDA approval and ACOG/SMFM/CDC strong recommendation in pregnancy.  Questions answered.       Return in about 4 weeks (around  10/7/2024) for FU OB x2, second one w anatomy US.            Kesha Billy, DO  09/09/2024    [facility] [place] [facility]  [address] [place] [address]  Dept: [phone]  Dept Fax: [phone]  Loc: [phone]  Loc Fax: [phone]

## 2024-09-09 ENCOUNTER — INITIAL PRENATAL (OUTPATIENT)
Dept: OBSTETRICS AND GYNECOLOGY | Facility: CLINIC | Age: 24
End: 2024-09-09
Payer: MEDICAID

## 2024-09-09 VITALS — SYSTOLIC BLOOD PRESSURE: 111 MMHG | WEIGHT: 217.8 LBS | DIASTOLIC BLOOD PRESSURE: 71 MMHG | BODY MASS INDEX: 39.83 KG/M2

## 2024-09-09 DIAGNOSIS — Z72.89 CURRENT EVERY DAY VAPING: ICD-10-CM

## 2024-09-09 DIAGNOSIS — Z34.80 SUPERVISION OF OTHER NORMAL PREGNANCY, ANTEPARTUM: Primary | ICD-10-CM

## 2024-09-09 LAB
AMPHET+METHAMPHET UR QL: NEGATIVE
BARBITURATES UR QL SCN: NEGATIVE
BENZODIAZ UR QL SCN: NEGATIVE
C TRACH RRNA CVX QL NAA+PROBE: NOT DETECTED
CANNABINOIDS SERPL QL: NEGATIVE
COCAINE UR QL: NEGATIVE
FENTANYL UR-MCNC: NEGATIVE NG/ML
GLUCOSE UR STRIP-MCNC: NEGATIVE MG/DL
METHADONE UR QL SCN: NEGATIVE
N GONORRHOEA RRNA SPEC QL NAA+PROBE: NOT DETECTED
OPIATES UR QL: NEGATIVE
OXYCODONE UR QL SCN: NEGATIVE
PROT UR STRIP-MCNC: NEGATIVE MG/DL

## 2024-09-09 PROCEDURE — 80307 DRUG TEST PRSMV CHEM ANLYZR: CPT | Performed by: OBSTETRICS & GYNECOLOGY

## 2024-09-09 PROCEDURE — 99214 OFFICE O/P EST MOD 30 MIN: CPT | Performed by: OBSTETRICS & GYNECOLOGY

## 2024-09-09 PROCEDURE — 87491 CHLMYD TRACH DNA AMP PROBE: CPT | Performed by: OBSTETRICS & GYNECOLOGY

## 2024-09-09 PROCEDURE — 87086 URINE CULTURE/COLONY COUNT: CPT | Performed by: OBSTETRICS & GYNECOLOGY

## 2024-09-09 PROCEDURE — G0123 SCREEN CERV/VAG THIN LAYER: HCPCS | Performed by: OBSTETRICS & GYNECOLOGY

## 2024-09-09 PROCEDURE — 87591 N.GONORRHOEAE DNA AMP PROB: CPT | Performed by: OBSTETRICS & GYNECOLOGY

## 2024-09-09 PROCEDURE — 99459 PELVIC EXAMINATION: CPT | Performed by: OBSTETRICS & GYNECOLOGY

## 2024-09-09 RX ORDER — PRENATAL VIT NO.126/IRON/FOLIC 28MG-0.8MG
TABLET ORAL DAILY
COMMUNITY

## 2024-09-10 ENCOUNTER — REFERRAL TRIAGE (OUTPATIENT)
Dept: LABOR AND DELIVERY | Facility: HOSPITAL | Age: 24
End: 2024-09-10
Payer: MEDICAID

## 2024-09-10 LAB — BACTERIA SPEC AEROBE CULT: NO GROWTH

## 2024-09-11 LAB
CYTOLOGIST CVX/VAG CYTO: NORMAL
CYTOLOGY CVX/VAG DOC CYTO: NORMAL
DX ICD CODE: NORMAL
Lab: NORMAL
OTHER STN SPEC: NORMAL
STAT OF ADQ CVX/VAG CYTO-IMP: NORMAL

## 2024-09-12 ENCOUNTER — TELEPHONE (OUTPATIENT)
Dept: UROLOGY | Facility: CLINIC | Age: 24
End: 2024-09-12
Payer: MEDICAID

## 2024-09-17 ENCOUNTER — LAB (OUTPATIENT)
Dept: OBSTETRICS AND GYNECOLOGY | Facility: CLINIC | Age: 24
End: 2024-09-17
Payer: MEDICAID

## 2024-09-17 DIAGNOSIS — Z34.80 SUPERVISION OF OTHER NORMAL PREGNANCY, ANTEPARTUM: ICD-10-CM

## 2024-09-17 LAB
ABO GROUP BLD: NORMAL
BASOPHILS # BLD AUTO: 0.04 10*3/MM3 (ref 0–0.2)
BASOPHILS NFR BLD AUTO: 0.4 % (ref 0–1.5)
BLD GP AB SCN SERPL QL: NEGATIVE
DEPRECATED RDW RBC AUTO: 38 FL (ref 37–54)
EOSINOPHIL # BLD AUTO: 0.02 10*3/MM3 (ref 0–0.4)
EOSINOPHIL NFR BLD AUTO: 0.2 % (ref 0.3–6.2)
ERYTHROCYTE [DISTWIDTH] IN BLOOD BY AUTOMATED COUNT: 11.6 % (ref 12.3–15.4)
HBV SURFACE AG SERPL QL IA: NORMAL
HCT VFR BLD AUTO: 41 % (ref 34–46.6)
HCV AB SER QL: NORMAL
HGB BLD-MCNC: 13.5 G/DL (ref 12–15.9)
HIV 1+2 AB+HIV1 P24 AG SERPL QL IA: NORMAL
IMM GRANULOCYTES # BLD AUTO: 0.04 10*3/MM3 (ref 0–0.05)
IMM GRANULOCYTES NFR BLD AUTO: 0.4 % (ref 0–0.5)
LYMPHOCYTES # BLD AUTO: 1.18 10*3/MM3 (ref 0.7–3.1)
LYMPHOCYTES NFR BLD AUTO: 12 % (ref 19.6–45.3)
MCH RBC QN AUTO: 29.9 PG (ref 26.6–33)
MCHC RBC AUTO-ENTMCNC: 32.9 G/DL (ref 31.5–35.7)
MCV RBC AUTO: 90.7 FL (ref 79–97)
MONOCYTES # BLD AUTO: 0.65 10*3/MM3 (ref 0.1–0.9)
MONOCYTES NFR BLD AUTO: 6.6 % (ref 5–12)
NEUTROPHILS NFR BLD AUTO: 7.89 10*3/MM3 (ref 1.7–7)
NEUTROPHILS NFR BLD AUTO: 80.4 % (ref 42.7–76)
NRBC BLD AUTO-RTO: 0 /100 WBC (ref 0–0.2)
PLATELET # BLD AUTO: 300 10*3/MM3 (ref 140–450)
PMV BLD AUTO: 9.6 FL (ref 6–12)
RBC # BLD AUTO: 4.52 10*6/MM3 (ref 3.77–5.28)
RH BLD: POSITIVE
WBC NRBC COR # BLD AUTO: 9.82 10*3/MM3 (ref 3.4–10.8)

## 2024-09-17 PROCEDURE — 36415 COLL VENOUS BLD VENIPUNCTURE: CPT | Performed by: OBSTETRICS & GYNECOLOGY

## 2024-09-17 PROCEDURE — 80081 OBSTETRIC PANEL INC HIV TSTG: CPT | Performed by: OBSTETRICS & GYNECOLOGY

## 2024-09-17 PROCEDURE — 86803 HEPATITIS C AB TEST: CPT | Performed by: OBSTETRICS & GYNECOLOGY

## 2024-09-18 LAB — RPR SER QL: NORMAL

## 2024-09-19 LAB — RUBV IGG SERPL IA-ACNC: 1.21 INDEX

## 2024-10-07 ENCOUNTER — ROUTINE PRENATAL (OUTPATIENT)
Dept: OBSTETRICS AND GYNECOLOGY | Facility: CLINIC | Age: 24
End: 2024-10-07
Payer: MEDICAID

## 2024-10-07 VITALS — SYSTOLIC BLOOD PRESSURE: 132 MMHG | BODY MASS INDEX: 41.69 KG/M2 | WEIGHT: 228 LBS | DIASTOLIC BLOOD PRESSURE: 86 MMHG

## 2024-10-07 DIAGNOSIS — Z34.80 SUPERVISION OF OTHER NORMAL PREGNANCY, ANTEPARTUM: Primary | ICD-10-CM

## 2024-10-07 LAB
GLUCOSE UR STRIP-MCNC: NEGATIVE MG/DL
PROT UR STRIP-MCNC: NEGATIVE MG/DL

## 2024-10-07 PROCEDURE — 99213 OFFICE O/P EST LOW 20 MIN: CPT | Performed by: NURSE PRACTITIONER

## 2024-10-07 NOTE — ASSESSMENT & PLAN NOTE
Doing well, no complaints  Patient just got off of work and is very tired today.   Anatomy US next visit  Second trimester precautions

## 2024-10-07 NOTE — PROGRESS NOTES
OB FOLLOW UP        Chief Complaint   Patient presents with    Routine Prenatal Visit       Subjective:   No complaints    Objective:  /86   Wt 103 kg (228 lb)   LMP 06/20/2024   BMI 41.69 kg/m²   Uterine Size: suprapubic  FHT: 110-160 BPM    See OB flow for LE edema, cvx exam if performed, and Upro/Uglu    Assessment and Plan:  15w4d  Reassuring pregnancy progress.  Questions answered.  Diagnoses and all orders for this visit:    1. Supervision of other normal pregnancy, antepartum (Primary)  Overview:  BALJINDER finalized: 3/27/2025 by LMP and 6-week ultrasound    Optional testing NIPS,CF/SMA,AFP:  Desires NIPS neg XY.  Declines CF 9/9/2024     COVID: Recommended 9/9/2024   Tdap:  RSV:  Flu:  Recommended 9/9/2024     1hr Glucola:  FU TPA w glucola:  ? Desires Sterilization:    Anatomy US:  FU US:    PROBLEM LIST/PLAN:   Anxiety/depression-no medications.  Prepregnancy Effexor.  Stable  Daily vaping- Risks discussed vaping, recommend continue to quit.  s/p nicotine patch, decreased to 7mg patch 9/9/2024   Obesity in pregnancy-pregravid BMI 39    Assessment & Plan:  Doing well, no complaints  Patient just got off of work and is very tired today.   Anatomy US next visit  Second trimester precautions    Orders:  -     POC Urinalysis Dipstick      Counseling:    Second trimester precautions  Continue PNV.  Importance of healthy eating and exercise.    Return in about 29 days (around 11/5/2024) for OB follow up, Dr. Billy, Anatomy ultrasound.        Wilver Forrester, APRN  10/07/2024    [facility] [address]  [facility]  [address]  [place] [address]  Dept: [phone]  Dept Fax: [phone]  Loc: [phone]

## 2024-11-01 ENCOUNTER — APPOINTMENT (OUTPATIENT)
Dept: ULTRASOUND IMAGING | Facility: HOSPITAL | Age: 24
End: 2024-11-01
Payer: MEDICAID

## 2024-11-01 ENCOUNTER — HOSPITAL ENCOUNTER (EMERGENCY)
Facility: HOSPITAL | Age: 24
Discharge: HOME | End: 2024-11-01
Attending: EMERGENCY MEDICINE
Payer: MEDICAID

## 2024-11-01 VITALS
RESPIRATION RATE: 16 BRPM | OXYGEN SATURATION: 100 % | SYSTOLIC BLOOD PRESSURE: 120 MMHG | BODY MASS INDEX: 42.03 KG/M2 | HEART RATE: 86 BPM | TEMPERATURE: 98.4 F | HEIGHT: 62 IN | WEIGHT: 228.4 LBS | DIASTOLIC BLOOD PRESSURE: 103 MMHG

## 2024-11-01 DIAGNOSIS — R10.9 ABDOMINAL PAIN IN PREGNANCY, SECOND TRIMESTER: Primary | ICD-10-CM

## 2024-11-01 DIAGNOSIS — O26.892 ABDOMINAL PAIN IN PREGNANCY, SECOND TRIMESTER: Primary | ICD-10-CM

## 2024-11-01 PROCEDURE — 99284 EMERGENCY DEPT VISIT MOD MDM: CPT

## 2024-11-01 PROCEDURE — 76815 OB US LIMITED FETUS(S): CPT
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
## 2024-11-01 NOTE — Clinical Note
[facility] EMERGENCY ROOM  [address] [place] [address]  Phone: [phone]  Fax: [phone]    Jennifer Francis was seen and treated in our emergency department on 11/1/2024.  She may return to work on 11/02/2024.         Thank you for choosing [facility].    Jaime Knowles MD

## 2024-11-01 NOTE — DISCHARGE INSTRUCTIONS
Her ultrasound, to the emergency department is normal.  Take Tylenol as needed for pain control.  Follow-up with your OB/GYN as you have scheduled.  Return to the emergency department for vaginal bleeding or any new or worsening symptoms concerning to you.
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
## 2024-11-05 ENCOUNTER — ROUTINE PRENATAL (OUTPATIENT)
Dept: OBSTETRICS AND GYNECOLOGY | Facility: CLINIC | Age: 24
End: 2024-11-05
Payer: MEDICAID

## 2024-11-05 VITALS — SYSTOLIC BLOOD PRESSURE: 122 MMHG | BODY MASS INDEX: 41.15 KG/M2 | DIASTOLIC BLOOD PRESSURE: 72 MMHG | WEIGHT: 225 LBS

## 2024-11-05 DIAGNOSIS — Z34.80 SUPERVISION OF OTHER NORMAL PREGNANCY, ANTEPARTUM: Primary | ICD-10-CM

## 2024-11-05 LAB
GLUCOSE UR STRIP-MCNC: NEGATIVE MG/DL
PROT UR STRIP-MCNC: NEGATIVE MG/DL

## 2024-11-27 NOTE — PROGRESS NOTES
OB FOLLOW UP      Chief Complaint   Patient presents with    Routine Prenatal Visit     Subjective:   No complaints.  Denies VB, leaking fluid, current regular cramping or contractions.    Some pain in anus, like hemorrhoids.  No hemtochezia.  No dyschezia.     Initial bp elev today, FU BP wnl.  Denies HTN sxs.      Objective:  /83   Wt 105 kg (231 lb)   LMP 06/20/2024   BMI 42.25 kg/m²  9.979 kg (22 lb) Body mass index is 42.25 kg/m².    See OB flow sheet for fundal height (not performed if US day of OV), FHT, edema, cvx exam if performed, and Upro/Uglu.   Chaperone present during pelvic exam if performed.  Declines exam today      Assessment and Plan:  24w1d     Diagnoses and all orders for this visit:    1. Supervision of other normal pregnancy, antepartum (Primary)  Overview:  BALJINDER finalized: 3/27/2025 by LMP and 6-week ultrasound    NIPS neg XY.  Declined CF/SMA.  Declined AFP     COVID: Recommended, declines  Flu:  Recommended, declines  Tdap:  Recommended, s/p Rx 12/6/2024   RSV:    1hr Glucola: 12/6/2024   FU TPA w glucola: 12/6/2024   ? Desires Sterilization:  Declined 12/6/2024     [place] [facility] [place] cwd, anterior placenta, breech, unable to see umb cord insertion on placenta- FU ordered  FU US:  [facility] [place]/24 1#8oz 46%, AC 45%,  nl cord insertion, VTX    PROBLEM LIST/PLAN:   Anxiety/depression-no medications.  Prepregnancy Effexor.  Stable  Obesity in pregnancy-pregravid BMI 39    Daily vaping- Risks prev discussed, recommend continue to quit.    s/p nicotine patch, decreased to 7mg patch Sept 2024 11/5/2024 now off nicotine vape, except only occas nicotine, rec quit, risks reviewed, handout on quitting options reviewed, importance and fetal risks.  Suspect more of habit than addiction.  1800 QUIT NOW provided.    Orders:  -     POC Urinalysis Dipstick  -     CBC (No Diff); Future  -     Gestational Diabetes Screen 1 Hour; Future  -     RPR, Rfx Qn RPR / Confirm TP  -     Prenatal MV-Min-Fe  Fum-FA-DHA (Prenatal Multivitamin + DHA) 28-0.8 & 200 MG misc; Take 1 tablet by mouth Daily.  Dispense: 90 each; Refill: 3  -     CBC (No Diff)  -     Gestational Diabetes Screen 1 Hour    2. External hemorrhoids  -     Hydrocortisone Ace-Pramoxine 1-1 % rectal cream; Insert  into the rectum 3 (Three) Times a Day As Needed for Hemorrhoids.  Dispense: 1 each; Refill: 1    3. Elevated blood pressure reading  Overview:  12/7/2024  at 24+1, fu BP wnl.  Start checking BP at home        Counseling:    OB precautions, leaking, VB, gerardo mckinnon vs PTL/Labor  FKC  HTN precautions reviewed: HA, vision change, RUQ/epigastric pain, edema.  I recommend patient obtain a blood pressure cuff and start checking her blood pressures at home.  She is to be seen immediately on labor and delivery for hypertensive symptoms or systolic 140 or above or diastolic 90 or above.  She agrees and understands importance.  Questions answered.    Reassuring pregnancy progress. Questions answered.  Continue PNV.  Importance of healthy eating, obtaining sufficient sleep, and staying active unless hypertensive- activity modified as directed.    Return in about 2 weeks (around 12/20/2024) for FU OB x3.            Kesha Billy, DO  12/06/2024    [facility]  [address] [place] [address]  Dept: [phone]  Dept Fax: [phone]  Loc: [phone]  Loc Fax: [phone]

## 2024-12-06 ENCOUNTER — ROUTINE PRENATAL (OUTPATIENT)
Dept: OBSTETRICS AND GYNECOLOGY | Facility: CLINIC | Age: 24
End: 2024-12-06
Payer: MEDICAID

## 2024-12-06 ENCOUNTER — TELEPHONE (OUTPATIENT)
Dept: OBSTETRICS AND GYNECOLOGY | Facility: CLINIC | Age: 24
End: 2024-12-06

## 2024-12-06 ENCOUNTER — PATIENT OUTREACH (OUTPATIENT)
Dept: LABOR AND DELIVERY | Facility: HOSPITAL | Age: 24
End: 2024-12-06
Payer: MEDICAID

## 2024-12-06 VITALS — DIASTOLIC BLOOD PRESSURE: 83 MMHG | BODY MASS INDEX: 42.25 KG/M2 | SYSTOLIC BLOOD PRESSURE: 138 MMHG | WEIGHT: 231 LBS

## 2024-12-06 DIAGNOSIS — K64.4 EXTERNAL HEMORRHOIDS: ICD-10-CM

## 2024-12-06 DIAGNOSIS — Z34.80 SUPERVISION OF OTHER NORMAL PREGNANCY, ANTEPARTUM: Primary | ICD-10-CM

## 2024-12-06 DIAGNOSIS — R03.0 ELEVATED BLOOD PRESSURE READING: ICD-10-CM

## 2024-12-06 LAB
DEPRECATED RDW RBC AUTO: 37.7 FL (ref 37–54)
ERYTHROCYTE [DISTWIDTH] IN BLOOD BY AUTOMATED COUNT: 11.9 % (ref 12.3–15.4)
GLUCOSE 1H P GLC SERPL-MCNC: 111 MG/DL (ref 65–139)
GLUCOSE UR STRIP-MCNC: NEGATIVE MG/DL
HCT VFR BLD AUTO: 37.3 % (ref 34–46.6)
HGB BLD-MCNC: 12.4 G/DL (ref 12–15.9)
MCH RBC QN AUTO: 29.2 PG (ref 26.6–33)
MCHC RBC AUTO-ENTMCNC: 33.2 G/DL (ref 31.5–35.7)
MCV RBC AUTO: 87.8 FL (ref 79–97)
PLATELET # BLD AUTO: 313 10*3/MM3 (ref 140–450)
PMV BLD AUTO: 10 FL (ref 6–12)
PROT UR STRIP-MCNC: NEGATIVE MG/DL
RBC # BLD AUTO: 4.25 10*6/MM3 (ref 3.77–5.28)
WBC NRBC COR # BLD AUTO: 12.65 10*3/MM3 (ref 3.4–10.8)

## 2024-12-06 PROCEDURE — 82950 GLUCOSE TEST: CPT | Performed by: OBSTETRICS & GYNECOLOGY

## 2024-12-06 PROCEDURE — 86592 SYPHILIS TEST NON-TREP QUAL: CPT | Performed by: OBSTETRICS & GYNECOLOGY

## 2024-12-06 PROCEDURE — 85027 COMPLETE CBC AUTOMATED: CPT | Performed by: OBSTETRICS & GYNECOLOGY

## 2024-12-06 RX ORDER — HYDROCORTISONE ACETATE PRAMOXINE HCL 1; 1 G/100G; G/100G
CREAM TOPICAL 3 TIMES DAILY PRN
Qty: 1 EACH | Refills: 1 | Status: SHIPPED | OUTPATIENT
Start: 2024-12-06

## 2024-12-06 RX ORDER — CHOLECALCIFEROL (VITAMIN D3) 50 MCG
1 TABLET ORAL DAILY
Qty: 90 EACH | Refills: 3 | Status: SHIPPED | OUTPATIENT
Start: 2024-12-06

## 2024-12-06 NOTE — TELEPHONE ENCOUNTER
The pharmacy called and requested that we change the pt's PNV to a brand that is covered by the pt's insurance.   I called the pharmacist and they said the brand that is covered is equivalent to the one that was originally sent with the exception of it doesn't have DHA in it.  Medicaid will not cover any PNV with DHA and they only cover the Leader brand Prenatal Multivitamin. Pt wanted to get this Rx for right now and then see if we can do a prior authorization with her insurance to see if they'll cover the one with DHA.  Please advise.  Thanks.

## 2024-12-06 NOTE — OUTREACH NOTE
[facility]  Enrollment    Current Estimated Gestational Age: 24w1d    Questions/Answers      Flowsheet Row Responses   Would like to participate? 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 No Barriers   What is the preferred language of the learner for medical teaching? English   Is an  required? No            Pediatrician: discussed picking one  FOB:   Feeding Plan: breast feeding, discussed getting pump through insurance and what types of pumps.    Discussed reasons to go to hospital, how to get to labor and delivery. Discussed GHTN and Pre-e due to elevated B/P today.     No current concerns with food, housing or transportation.  Encouraged to reach out for any questions, needs or concerns.    Tobacco, Alcohol, and Drug History     reports that she has never smoked. She has never used smokeless tobacco.   reports no history of alcohol use.   reports no history of drug use.    Danni Zimmer RN  Maternity Nurse Navigator    12/6/2024, 11:25 EST

## 2024-12-07 PROBLEM — R03.0 ELEVATED BLOOD PRESSURE READING: Status: ACTIVE | Noted: 2024-12-07

## 2024-12-08 LAB — RPR SER QL: NON REACTIVE

## 2024-12-09 ENCOUNTER — TELEPHONE (OUTPATIENT)
Dept: OBSTETRICS AND GYNECOLOGY | Facility: CLINIC | Age: 24
End: 2024-12-09
Payer: MEDICAID

## 2024-12-09 NOTE — TELEPHONE ENCOUNTER
I called the pharmacist and she confirmed again that DHA prenatals are not covered by her plan and that most vitamins typically are not covered as they are considered over the counter and at low cost.  This particular brand is the only one they have in stock that is covered.  I have let her know that Dr. Billy is ok with switching the PNV to the one that is covered by her insurance.

## 2024-12-09 NOTE — TELEPHONE ENCOUNTER
Jennifer was seen on Friday and her blood pressure was elevated however on repeat it was normal but still borderline.  I had originally scheduled a follow-up for her in 2 weeks that I would actually like to see her back for just a blood pressure check this week if possible please.  Would you please contact her and get that scheduled?  Thank you

## 2024-12-13 ENCOUNTER — TELEPHONE (OUTPATIENT)
Dept: OBSTETRICS AND GYNECOLOGY | Facility: CLINIC | Age: 24
End: 2024-12-13
Payer: MEDICAID

## 2024-12-13 ENCOUNTER — PATIENT OUTREACH (OUTPATIENT)
Dept: LABOR AND DELIVERY | Facility: HOSPITAL | Age: 24
End: 2024-12-13
Payer: MEDICAID

## 2024-12-13 NOTE — TELEPHONE ENCOUNTER
I spoke with the pharmacist on 12/09/24 and let her know that Dr. Billy ok'd for them to switch the Rx to the Leader brand PNV that does not have DHA.

## 2024-12-13 NOTE — OUTREACH NOTE
[facility]    [facility] second trimester information sent.    Danni Zimmer RN  Maternity Nurse Navigator    12/13/2024, 09:59 EST

## 2024-12-13 NOTE — TELEPHONE ENCOUNTER
Caller: Jennifer Francis  Female, 24 y.o., 2000  MRN: [id number]  CSN: [id number]  Phone: [phone]    Relationship to patient: SELF          Type of visit: OB FOLLOW UP    Requested date: 12-23 ANYTIME     If rescheduling, when is the original appointment: 12-20-24     Additional notes:PT WOULD LIKE A MESSAGE SENT THROUGH [facility] WITH UPDATED APPT INFO

## 2024-12-16 NOTE — PROGRESS NOTES
OB FOLLOW UP      Chief Complaint   Patient presents with    Routine Prenatal Visit     Subjective:   No complaints.  Denies VB, leaking fluid, current regular cramping or contractions.    Noting movement, but not a lot. Today feeling movement w doppler.    Objective:  /75   Wt 107 kg (235 lb)   LMP 06/20/2024   BMI 42.98 kg/m²  11.8 kg (26 lb) Body mass index is 42.98 kg/m².    See OB flow sheet for fundal height (not performed if US day of OV), FHT, edema, cvx exam if performed, and Upro/Uglu.   Active FM heard w doppler.  Chaperone present during pelvic exam if performed.      Assessment and Plan:  26w1d     Diagnoses and all orders for this visit:    1. Supervision of other normal pregnancy, antepartum (Primary)  Overview:  BALJINDER finalized: 3/27/2025 by LMP and 6-week ultrasound    NIPS neg XY.  Declined CF/SMA.  Declined AFP     COVID: Recommended, declines  Flu:  Recommended, declines  Tdap:  Recommended, s/p Rx   RSV:    1hr Glucola: 111  FU TPA w glucola: RPR NR  ? Desires Sterilization:  Declined     Anatomy US: [facility] 11/5/24 cwd, anterior placenta, breech, unable to see umb cord insertion on placenta- FU ordered  FU US:  [facility] 12/6/24 1#8oz 46%, AC 45%,  nl cord insertion, VTX  FU US: [facility] ordered 12/20/2024     PROBLEM LIST/PLAN:   Anxiety/depression-no medications.  Prepregnancy Effexor.  Stable  Obesity in pregnancy-pregravid BMI 39   Plan later third tri US growth and APT    Daily vaping- Risks prev discussed, recommend continue to quit.    s/p nicotine patch, decreased to 7mg patch Sept 2024 11/5/2024 now off nicotine vape, except only occas nicotine, rec quit, risks reviewed, handout on quitting options reviewed, importance and fetal risks.  Suspect more of habit than addiction.  1800 QUIT NOW provided.    Orders:  -     POC Urinalysis Dipstick    2. Obesity in pregnancy, antepartum  -     US Ob 14 + Weeks Single or First Gestation; Future      Counseling:    OB precautions, leaking, VB,  gerardo mckinnon vs PTL/Labor  [facility] discussed starting FM counts at 28weeks.    Reassuring pregnancy progress. Questions answered.  Continue PNV.  Importance of healthy eating, obtaining sufficient sleep, and staying active unless hypertensive- activity modified as directed.    Return for FU OB q2wks to 36weeks.            Kesha Billy,   12/20/2024    [facility] [place] [facility]  [facility] [place] [address]  Dept: [phone]  Dept Fax: [phone]  Loc: [phone]  Loc Fax: [phone]

## 2024-12-20 ENCOUNTER — ROUTINE PRENATAL (OUTPATIENT)
Dept: OBSTETRICS AND GYNECOLOGY | Facility: CLINIC | Age: 24
End: 2024-12-20
Payer: MEDICAID

## 2024-12-20 VITALS — WEIGHT: 235 LBS | DIASTOLIC BLOOD PRESSURE: 75 MMHG | SYSTOLIC BLOOD PRESSURE: 113 MMHG | BODY MASS INDEX: 42.98 KG/M2

## 2024-12-20 DIAGNOSIS — Z34.80 SUPERVISION OF OTHER NORMAL PREGNANCY, ANTEPARTUM: Primary | ICD-10-CM

## 2024-12-20 DIAGNOSIS — O99.210 OBESITY IN PREGNANCY, ANTEPARTUM: ICD-10-CM

## 2024-12-20 LAB
GLUCOSE UR STRIP-MCNC: NEGATIVE MG/DL
PROT UR STRIP-MCNC: NEGATIVE MG/DL

## 2024-12-30 NOTE — PROGRESS NOTES
OB FOLLOW UP      Chief Complaint   Patient presents with    Routine Prenatal Visit     Subjective:   No complaints.  Denies VB, leaking fluid, current regular cramping or contractions.    Objective:  /82   Wt 110 kg (242 lb)   LMP 06/20/2024   BMI 44.26 kg/m²  15 kg (33 lb) Body mass index is 44.26 kg/m².  Chaperone present during pelvic exam if performed.  See OB flow sheet for fundal height (not performed if US day of OV), FHT, edema, cvx exam if performed, and Upro/Uglu.       Assessment and Plan:  27w5d     Diagnoses and all orders for this visit:    1. Supervision of other normal pregnancy, antepartum (Primary)  Overview:  BALJINDER finalized: 3/27/2025 by LMP and 6-week ultrasound    NIPS neg XY.  Declined CF/SMA.  Declined AFP     COVID: Recommended, declines  Flu:  Recommended, declines  Tdap:  Recommended, s/p Rx     1hr Glucola: 111  FU TPA w glucola: RPR NR  ? Desires Sterilization:  Declined     Anatomy US: [facility] 11/5/24 cwd, anterior placenta, breech, unable to see umb cord insertion on placenta- FU ordered  FU US:  [facility] 12/6/24 1#8oz 46%, AC 45%,  nl cord insertion, VTX  FU US: [facility] 1/30/25    PROBLEM LIST/PLAN:   Anxiety/depression-no medications.  Prepregnancy Effexor.  Stable  Obesity in pregnancy-pregravid BMI 39   Plan later third tri US growth and APT    Daily vaping- Risks prev discussed, recommend continue to quit.    s/p nicotine patch, decreased to 7mg patch Sept 2024 11/5/2024 now off nicotine vape, except only occas nicotine, rec quit, risks reviewed, handout on quitting options reviewed, importance and fetal risks.  Suspect more of habit than addiction.  1800 QUIT NOW provided.    Orders:  -     POC Urinalysis Dipstick    2. Decreased fetal movements in second trimester, single or unspecified fetus  -     Fetal Nonstress Test; Future      Counseling:    OB precautions, leaking, VB, gerardo mckinnon vs PTL/Labor  FKC  WEIGHT GAIN in pregnancy reviewed.  Healthy dietary choices (increasing  PRO/vegetables, decreasing CHO/fats and fast food), monitoring portion sizes, and exercise were encouraged.  Importance of monitoring diet for a healthy pregnancy and healthy fetus/infant.      Reassuring pregnancy progress. Questions answered.  Continue PNV.  Importance of healthy eating, obtaining sufficient sleep, and staying active unless hypertensive- activity modified as directed.    Return in about 2 weeks (around 1/14/2025) for FU OB q2wks to 36weeks, NST today now for dec FM.            Kesha Billy, DO  12/31/2024    [facility] [place] [facility]  [address] [place] [address]  Dept: [phone]  Dept Fax: [phone]  Loc: [phone]  Loc Fax: [phone]

## 2024-12-31 ENCOUNTER — HOSPITAL ENCOUNTER (OUTPATIENT)
Facility: HOSPITAL | Age: 24
Discharge: HOME | End: 2024-12-31
Attending: OBSTETRICS & GYNECOLOGY | Admitting: OBSTETRICS & GYNECOLOGY
Payer: MEDICAID

## 2024-12-31 ENCOUNTER — ROUTINE PRENATAL (OUTPATIENT)
Dept: OBSTETRICS AND GYNECOLOGY | Facility: CLINIC | Age: 24
End: 2024-12-31
Payer: MEDICAID

## 2024-12-31 VITALS — BODY MASS INDEX: 44.26 KG/M2 | DIASTOLIC BLOOD PRESSURE: 82 MMHG | SYSTOLIC BLOOD PRESSURE: 124 MMHG | WEIGHT: 242 LBS

## 2024-12-31 VITALS — HEART RATE: 65 BPM | SYSTOLIC BLOOD PRESSURE: 129 MMHG | RESPIRATION RATE: 18 BRPM | DIASTOLIC BLOOD PRESSURE: 69 MMHG

## 2024-12-31 DIAGNOSIS — Z34.80 SUPERVISION OF OTHER NORMAL PREGNANCY, ANTEPARTUM: Primary | ICD-10-CM

## 2024-12-31 DIAGNOSIS — O36.8120 DECREASED FETAL MOVEMENTS IN SECOND TRIMESTER, SINGLE OR UNSPECIFIED FETUS: ICD-10-CM

## 2024-12-31 LAB
GLUCOSE UR STRIP-MCNC: NEGATIVE MG/DL
PROT UR STRIP-MCNC: NEGATIVE MG/DL

## 2024-12-31 PROCEDURE — G0463 HOSPITAL OUTPT CLINIC VISIT: HCPCS

## 2024-12-31 PROCEDURE — 59025 FETAL NON-STRESS TEST: CPT

## 2024-12-31 NOTE — PROGRESS NOTES
RACHAEL Davies  Obstetric History and Physical    Chief Complaint   Patient presents with    Non-stress Test     Sent from office for decreased blood pressure       Subjective     Patient is a 24 y.o. female  currently at 27w5d, who presents with complaint of decreased fetal movement.  Patient was sent from the office.  She denies any vaginal bleeding or loss of fluid.    [place]  [facility] [place] is benign.  Her previous obstetric/gynecological history is noted for is non-contributory.    The following portions of the patients history were reviewed and updated as appropriate: current medications, allergies, past medical history, past surgical history, past family history, and past social history .       Prenatal Information:  Prenatal Results       Initial Prenatal Labs       Test Value Reference Range Date Time    Hemoglobin  13.5 g/dL 12.0 - 15.9 24 0926    Hematocrit  41.0 % 34.0 - 46.6 24 0926    Platelets  300 10*3/mm3 140 - 450 24 0926    Rubella IgG  1.21 index Immune >0.99 24 0926    Hepatitis B SAg  Non-Reactive  Non-Reactive 24 0926    Hepatitis C Ab  Non-Reactive  Non-Reactive 24 0926    RPR  Non Reactive  Non Reactive 24 1607       Non-Reactive  Non-Reactive 24 0926    T. Pallidum Ab         ABO  AB   24 0926    Rh  Positive   24 0926    Antibody Screen  Negative   24 0926    HIV  Non-Reactive  Non-Reactive 24 0926    Urine Culture  No growth   24 1137    Gonorrhea  Not Detected  Not Detected  24 1137    Chlamydia  Not Detected  Not Detected  24 1137    TSH        HgB A1c         Varicella IgG        Hemoglobinopathy Fractionation        Hemoglobinopathy (genetic testing)        Cystic fibrosis         Spinal muscular atrophy        Fragile X                  Fetal testing        Test Value Reference Range Date Time    NIPT        MSAFP        AFP-4                  2nd and 3rd Trimester        Test Value Reference Range Date Time    Hemoglobin (repeated)  12.4 g/dL 12.0 - 15.9 12/06/24 1607    Hematocrit (repeated)  37.3 % 34.0 - 46.6 12/06/24 1607    Platelets   313 10*3/mm3 140 - 450 12/06/24 1607       300 10*3/mm3 140 - 450 09/17/24 0926    1 hour GTT   111 mg/dL 65 - 139 12/06/24 1607    Antibody Screen (repeated)        3rd TM syphilis scrn (repeated)  RPR         3rd TM syphilis scrn (repeated) TP-Ab        3rd TM syphilis screen TB-Ab (FTA)        Syphilis cascade test TP-Ab (EIA)        Syphilis cascade TPPA        GTT Fasting        GTT 1 Hr        GTT 2 Hr        GTT 3 Hr        Group B Strep                  Other testing        Test Value Reference Range Date Time    Parvo IgG         CMV IgG                   Drug Screening       Test Value Reference Range Date Time    Amphetamine Screen        Barbiturate Screen  Negative  Negative 09/09/24 1137    Benzodiazepine Screen  Negative  Negative 09/09/24 1137    Methadone Screen  Negative  Negative 09/09/24 1137    Phencyclidine Screen        Opiates Screen  Negative  Negative 09/09/24 1137    THC Screen  Negative  Negative 09/09/24 1137    Cocaine Screen  Negative  Negative 09/09/24 1137    Propoxyphene Screen        Buprenorphine Screen        Methamphetamine Screen        Oxycodone Screen  Negative  Negative 09/09/24 1137    Tricyclic Antidepressants Screen                  Legend    ^: Historical                          External Prenatal Results       Pregnancy Outside Results - Transcribed From Office Records - See Scanned Records For Details       Test Value Date Time    ABO  AB  09/17/24 0926    Rh  Positive  09/17/24 0926    Antibody Screen  Negative  09/17/24 0926    Varicella IgG       Rubella  1.21 index 09/17/24 0926    Hgb  12.4 g/dL 12/06/24 1607       13.5 g/dL 09/17/24 0926    Hct  37.3 % 12/06/24 1607       41.0 % 09/17/24 0926    HgB A1c        1h GTT  111 mg/dL 12/06/24 1607    3h GTT Fasting       3h GTT 1 hour       3h GTT 2  hour       3h GTT 3 hour        Gonorrhea (discrete)  Not Detected  24 1137    Chlamydia (discrete)  Not Detected  24 1137    RPR  Non Reactive  24 1607       Non-Reactive  24 0926    Syphils cascade: TP-Ab (FTA)       TP-Ab       TP-Ab (EIA)       TPPA       HBsAg  Non-Reactive  24 0926    Herpes Simplex Virus PCR       Herpes Simplex VIrus Culture       HIV  Non-Reactive  24 0926    Hep C RNA Quant PCR       Hep C Antibody  Non-Reactive  24 0926    AFP       NIPT       Cystic Fibrosis (Samm)       Cystic Fibroisis        Spinal Muscular atrophy       Fragile X       Group B Strep       GBS Susceptibility to Clindamycin       GBS Susceptibility to Erythromycin       Fetal Fibronectin       Genetic Testing, Maternal Blood                 Drug Screening       Test Value Date Time    Urine Drug Screen       Amphetamine Screen       Barbiturate Screen  Negative  24 1137    Benzodiazepine Screen  Negative  24 1137    Methadone Screen  Negative  24 1137    Phencyclidine Screen       Opiates Screen  Negative  24 1137    THC Screen  Negative  24 1137    Cocaine Screen       Propoxyphene Screen       Buprenorphine Screen       Methamphetamine Screen       Oxycodone Screen  Negative  24 1137    Tricyclic Antidepressants Screen                 Legend    ^: Historical                             Past OB History:     OB History    Para Term  AB Living   2 0 0 0 1 0   SAB IAB Ectopic Molar Multiple Live Births   1 0 0 0 0 0      # Outcome Date GA Lbr Juarez/2nd Weight Sex Type Anes PTL Lv   2 Current            1 SAB 24 5w0d              Past Medical History: Past Medical History:   Diagnosis Date    Anxiety and depression 2024    Chlamydia     Chronic fatigue syndrome 2023    Chronic idiopathic constipation 2023    SAB (spontaneous ) 2024    Completed  2024 8weeks by LMP, SAB, no D+C        Past  Surgical History Past Surgical History:   Procedure Laterality Date    ANKLE SURGERY Right       Family History: Family History   Problem Relation Age of Onset    Hypertension Father     Breast cancer Neg Hx     Ovarian cancer Neg Hx     Uterine cancer Neg Hx     Colon cancer Neg Hx     Diabetes Neg Hx     Deep vein thrombosis Neg Hx     Pulmonary embolism Neg Hx       Social History:  reports that she has never smoked. She has never used smokeless tobacco.   reports no history of alcohol use.   reports no history of drug use.        General ROS: Pertinent items are noted in HPI    Objective       Vital Signs Range for the last 24 hours  Temperature:     Temp Source:     BP: BP: (124-129)/(69-82) 129/69   Pulse: Heart Rate:  [65] 65   Respirations: Resp:  [18] 18   SPO2:     O2 Amount (l/min):     O2 Devices     Weight: Weight:  [110 kg (242 lb)] 110 kg (242 lb)     Physical Examination: General appearance - alert, well appearing, and in no distress  Mental status - alert, oriented to person, place, and time  Chest - clear to auscultation, no wheezes, rales or rhonchi, symmetric air entry  Heart - normal rate, regular rhythm, normal S1, S2, no murmurs, rubs, clicks or gallops  Abdomen - soft, nontender, gravid  Extremities - peripheral pulses normal, no pedal edema, no clubbing or cyanosis  Skin - normal coloration and turgor, no rashes, no suspicious skin lesions noted    Presentation: Unknown   Cervix: Exam by:     Dilation:     Effacement:     Station:         Fetal Heart Rate Assessment   Method: Fetal HR Assessment Method: external   Beats/min: Fetal HR (beats/min): 130   Baseline: Fetal HR Baseline: normal range   Variability: Fetal HR Variability: moderate (amplitude range 6 to 25 bpm)   Accels: Fetal HR Accelerations: other (see comments) (APPROPRIATE FOR GESTATIONAL AGE)   Decels:           Uterine Assessment   Method: Method: external tocotransducer, palpation   Frequency (min):     Ctx Count in 10 min:      Duration:     Intensity: Contraction Intensity: no contractions       Browns Units:       GBS is unknown      Assessment & Plan     Decreased fetal movements      Assessment:  1.  Intrauterine pregnancy at 27w5d gestation with reactive fetal status.    2.  IUP at 27 weeks estimate gestational age with reassuring fetal testing.  Patient reports fetal movements on labor and delivery.    Plan:  Discharge home  Strict fetal kick count   labor precautions      Sterling Yu MD  2024  10:51 EST

## 2024-12-31 NOTE — NON STRESS TEST
Obstetrical Non-stress Test Interpretation     Name:  Jennifer Francis  MRN: [id number]    24 y.o. female  at 27w5d    Indication: SENT FROM OFFICE FOR DECREASED FETAL MOVEMENT      Fetal Assessment  Fetal Movement: active  Fetal HR Assessment Method: external  Fetal HR (beats/min): 130  Fetal HR Baseline: normal range  Fetal HR Variability: moderate (amplitude range 6 to 25 bpm)  Fetal HR Accelerations: other (see comments) (APPROPRIATE FOR GESTATIONAL AGE)    /69 (BP Location: Right arm, Patient Position: Lying)   Pulse 65   Resp 18   LMP 2024     Reason for test: OB Triage (SENT FROM OFFIC FOR DECREASED FETAL MOVEMENT)  Date of Test: 2024  Time frame of test:   RN NST Interpretation:  (APPROPRIATE FOR GESTATIONAL AGE)      Neetu Colon RN  2024  10:48 EST

## 2025-01-13 NOTE — PROGRESS NOTES
OB FOLLOW UP      Chief Complaint   Patient presents with    Routine Prenatal Visit     Subjective:   No complaints.  Denies VB, leaking fluid, current regular cramping or contractions.    Objective:  /75   Wt 109 kg (241 lb)   LMP 06/20/2024   BMI 44.08 kg/m²  14.5 kg (32 lb) Body mass index is 44.08 kg/m².  Chaperone present during pelvic exam if performed.  See OB flow sheet for fundal height (not performed if US day of OV), FHT, edema, cvx exam if performed, and Upro/Uglu.       Assessment and Plan:  30w0d     Diagnoses and all orders for this visit:    1. Supervision of other normal pregnancy, antepartum (Primary)  Overview:  BALJINDER finalized: 3/27/2025 by LMP and 6-week ultrasound    NIPS neg XY.  Declined CF/SMA.  Declined AFP     COVID: Recommended, declines  Flu:  Recommended, declines  Tdap:  Recommended, s/p Rx     1hr Glucola: 111  FU TPA w glucola: RPR NR  ? Desires Sterilization:  Declined     Anatomy US: [facility] 11/5/24 cwd, anterior placenta, breech, unable to see umb cord insertion on placenta- FU ordered  FU US:  [facility] 12/6/24 1#8oz 46%, AC 45%,  nl cord insertion, VTX  FU US: [facility] 1/30/25    PROBLEM LIST/PLAN:   Anxiety/depression-no medications.  Prepregnancy Effexor.  Stable  Obesity in pregnancy-pregravid BMI 39   US growth and APT    Daily vaping- Risks prev discussed, recommend continue to quit.    s/p nicotine patch, decreased to 7mg patch Sept 2024 11/5/2024 now off nicotine vape, except only occas nicotine, rec quit, risks reviewed, handout on quitting options reviewed, importance and fetal risks.  Suspect more of habit than addiction.  1800 QUIT NOW provided.    Orders:  -     POC Urinalysis Dipstick      Counseling:    OB precautions, leaking, VB, gerardo mckinnon vs PTL/Labor  FKC    Reassuring pregnancy progress. Questions answered.  Continue PNV.  Importance of healthy eating, obtaining sufficient sleep, and staying active unless hypertensive- activity modified as directed.    Return  in about 2 weeks (around 1/30/2025) for FU OB q2wks to 36weeks.            Kesha Billy,   01/16/2025    [facility] [place] [facility]  [address] [place] [address]  Dept: [phone]  Dept Fax: [phone]  Loc: [phone]  Loc Fax: [phone]

## 2025-01-16 ENCOUNTER — ROUTINE PRENATAL (OUTPATIENT)
Dept: OBSTETRICS AND GYNECOLOGY | Facility: CLINIC | Age: 25
End: 2025-01-16
Payer: MEDICAID

## 2025-01-16 VITALS — WEIGHT: 241 LBS | DIASTOLIC BLOOD PRESSURE: 75 MMHG | SYSTOLIC BLOOD PRESSURE: 124 MMHG | BODY MASS INDEX: 44.08 KG/M2

## 2025-01-16 DIAGNOSIS — Z34.80 SUPERVISION OF OTHER NORMAL PREGNANCY, ANTEPARTUM: Primary | ICD-10-CM

## 2025-01-16 LAB
GLUCOSE UR STRIP-MCNC: NEGATIVE MG/DL
PROT UR STRIP-MCNC: NEGATIVE MG/DL

## 2025-01-21 ENCOUNTER — PATIENT OUTREACH (OUTPATIENT)
Dept: LABOR AND DELIVERY | Facility: HOSPITAL | Age: 25
End: 2025-01-21
Payer: MEDICAID

## 2025-01-21 NOTE — OUTREACH NOTE
[facility]  Check-In    Current Estimated Gestational Age: 30w5d      Questions/Answers      Flowsheet Row Responses   Best Method for Contacting Cell   Currently Employed Yes   Able to keep appointments as scheduled Yes   Baby Active/Feeling Fetal Movemen Yes   How are you presently feeling? Good   Questions regarding prenatal visits or tests to be ordered? No   Special Considerations Birth Plan, Birthing Ball   Supplies ready for baby Diapers, Feeding Supplies   Resource/Environmental Concerns None   Do you have any questions related to your care experience, your pregnancy, plans for delivery, any concerns, etc? Yes   Other Education Birth Plan            Questions about birthplace tour. Will see about scheduling tour on weekend with her and FOB. Questions about labor, pain management, reasons for c/s, and normal pregnancy symptoms. All questions answered. Encouraged to reach out for any questions, needs or concerns.       Danni Zimmer RN  Maternity Nurse Navigator    1/21/2025, 15:55 EST

## 2025-01-27 NOTE — PROGRESS NOTES
OB FOLLOW UP      Chief Complaint   Patient presents with    Routine Prenatal Visit     Subjective:   MM cramps, in feet and feet swelling  Denies VB, leaking fluid, current regular cramping or contractions.    Objective:  /85   Wt 113 kg (250 lb)   LMP 06/20/2024   BMI 45.73 kg/m²  18.6 kg (41 lb) Body mass index is 45.73 kg/m².  Chaperone present during pelvic exam if performed.  See OB flow sheet for fundal height (not performed if US day of OV), FHT, edema, cvx exam if performed, and Upro/Uglu.       Assessment and Plan:  32w0d     Diagnoses and all orders for this visit:    1. Supervision of other normal pregnancy, antepartum (Primary)  Overview:  BALJINDER finalized: 3/27/2025 by LMP and 6-week ultrasound    NIPS neg XY.  Declined CF/SMA.  Declined AFP     COVID: Recommended, declines  Flu:  Recommended, declines  Tdap:  Vaccinated Jan 2025    1hr Glucola: 111  FU TPA w glucola: RPR NR  ? Desires Sterilization:  Declined     Anatomy US: [facility] 11/5/24 cwd, anterior placenta, breech, unable to see umb cord insertion on placenta- FU ordered  FU US:  [facility] 12/6/24 1#8oz 46%, AC 45%,  nl cord insertion, VTX  FU US: [facility] 1/30/25 3#15oz, 25%,  AC 13%, RITA 15, VTX,  gd 2 placenta. FU US ordered recheck growth 1/30/2025      PROBLEM LIST/PLAN:   Anxiety/depression-no medications.  Prepregnancy Effexor.  Stable  Obesity in pregnancy-pregravid BMI 39   US growth and APT    Daily vaping- Risks prev discussed, recommend continue to quit.    s/p nicotine patch, decreased to 7mg patch Sept 2024 11/5/2024 now off nicotine vape, except only occas nicotine, rec quit, risks reviewed, handout on quitting options reviewed, importance and fetal risks.  Suspect more of habit than addiction.  1800 QUIT NOW provided.    Orders:  -     POC Urinalysis Dipstick  -     US Ob 14 + Weeks Single or First Gestation; Future      Counseling:    OB precautions, leaking, VB, gerardo mckinnon vs PTL/Labor  FKC  EDEMA of lower extremities in  pregnancy reviewed.  We discussed conservative options to include dietary changes, elevation, and compression stockings.    LEG MUSCLE CRAMPS reviewed conservative options to include increasing potassium in diet, over-the-counter magnesium supplementation and resetting muscle spindles with angela into the cramp.  SMOKING (NLT cigarettes, vaping, hookahs, cigars, second hand smoke) discussed, maternal and fetal/infant risks and importance of quitting reviewed.  Increased risk of SAB, IUFD, LBW.  Nicotine replacement therapy is option for treatment and/or Bupropion (Wellbutrin).  National quit line: 1-800-QUIT NOW.  WEIGHT GAIN in pregnancy reviewed.  Healthy dietary choices (increasing PRO/vegetables, decreasing CHO/fats and fast food), monitoring portion sizes, and exercise were encouraged.  Importance of monitoring diet for a healthy pregnancy and healthy fetus/infant.      Reassuring pregnancy progress. Questions answered.  Continue PNV.  Importance of healthy eating, obtaining sufficient sleep, and staying active unless hypertensive- activity modified as directed.    Return in about 2 weeks (around 2/13/2025) for FU OB q2wks to 36weeks, then weekly x2.  US 4weeks recheck AC/growth.            Kesha Billy, DO  01/30/2025    [facility]  [address] [place] [address]  Dept: [phone]  Dept Fax: [phone]  Loc: [phone]  Loc Fax: [phone]

## 2025-01-30 ENCOUNTER — ROUTINE PRENATAL (OUTPATIENT)
Dept: OBSTETRICS AND GYNECOLOGY | Facility: CLINIC | Age: 25
End: 2025-01-30
Payer: MEDICAID

## 2025-01-30 VITALS — BODY MASS INDEX: 45.73 KG/M2 | WEIGHT: 250 LBS | DIASTOLIC BLOOD PRESSURE: 85 MMHG | SYSTOLIC BLOOD PRESSURE: 132 MMHG

## 2025-01-30 DIAGNOSIS — Z34.80 SUPERVISION OF OTHER NORMAL PREGNANCY, ANTEPARTUM: Primary | ICD-10-CM

## 2025-01-30 LAB
GLUCOSE UR STRIP-MCNC: NEGATIVE MG/DL
PROT UR STRIP-MCNC: NEGATIVE MG/DL

## 2025-02-07 NOTE — PROGRESS NOTES
OB FOLLOW UP      Chief Complaint   Patient presents with    Routine Prenatal Visit     Subjective:   Seen L+D last WE for N/V.  Never picked up meds.  Feels better now.    No complaints.  Denies VB, leaking fluid, current regular cramping or contractions.    Objective:  /78   Wt 113 kg (250 lb)   LMP 06/20/2024   BMI 45.73 kg/m²  18.6 kg (41 lb) Body mass index is 45.73 kg/m².  Chaperone present during pelvic exam if performed.  See OB flow sheet for fundal height (not performed if US day of OV), FHT, edema, cvx exam if performed, and Upro/Uglu.       Assessment and Plan:  33w5d     Diagnoses and all orders for this visit:    1. Supervision of other normal pregnancy, antepartum (Primary)  Overview:  BALJINDER finalized: 3/27/2025 by LMP and 6-week ultrasound    NIPS neg XY.  Declined CF/SMA.  Declined AFP     COVID: Recommended, declines  Flu:  Recommended, declines  Tdap:  Vaccinated Jan 2025    1hr Glucola: 111  FU TPA w glucola: RPR NR  ? Desires Sterilization:  Declined     Anatomy US: [facility] 11/5/24 cwd, anterior placenta, breech, unable to see umb cord insertion on placenta- FU ordered  FU US:  [facility] 12/6/24 1#8oz 46%, AC 45%,  nl cord insertion, VTX  FU US: [facility] 1/30/25 3#15oz, 25%,  AC 13%, RITA 15, VTX,  gd 2 placenta. FU US ordered recheck growth   FU US: [facility] 2/27/25      PROBLEM LIST/PLAN:   Anxiety/depression-no medications.  Prepregnancy Effexor.  Stable  Obesity in pregnancy-pregravid BMI 39   US growth and APT, weekly NSTs ordered 2/11/2025     Daily vaping- Risks prev discussed, recommend continue to quit.  2/11/2025 has stopped  s/p nicotine patch, decreased to 7mg patch Sept 2024 11/5/2024 now off nicotine vape, except only occas nicotine, rec quit, risks reviewed, handout on quitting options reviewed, importance and fetal risks.  Suspect more of habit than addiction.  1800 QUIT NOW provided.    Orders:  -     POC Urinalysis Dipstick    2. Obesity in pregnancy, antepartum  -     Fetal Nonstress  Test; Standing      Counseling:    OB precautions, leaking, VB, gerardo mckinnon vs PTL/Labor  FK  HTN precautions reviewed: HA, vision change, RUQ/epigastric pain, edema    Reassuring pregnancy progress. Questions answered.  Continue PNV.  Importance of healthy eating, obtaining sufficient sleep, and staying active unless hypertensive- activity modified as directed.    Return in about 2 weeks (around 2/25/2025) for FU OB then weekly to BALJINDER, Schedule NSTs weekly starting at 34weeks..            Kesha Billy,   02/11/2025    [facility]  [address] [place] [address]  Dept: [phone]  Dept Fax: [phone]  Loc: [phone]  Loc Fax: [phone]

## 2025-02-09 ENCOUNTER — HOSPITAL ENCOUNTER (OUTPATIENT)
Facility: HOSPITAL | Age: 25
Discharge: HOME | End: 2025-02-09
Attending: OBSTETRICS & GYNECOLOGY | Admitting: OBSTETRICS & GYNECOLOGY
Payer: MEDICAID

## 2025-02-09 VITALS
DIASTOLIC BLOOD PRESSURE: 70 MMHG | OXYGEN SATURATION: 99 % | HEART RATE: 98 BPM | SYSTOLIC BLOOD PRESSURE: 135 MMHG | TEMPERATURE: 98.8 F | RESPIRATION RATE: 18 BRPM

## 2025-02-09 PROBLEM — K52.9 GASTROENTERITIS: Status: ACTIVE | Noted: 2025-02-09

## 2025-02-09 LAB
ALBUMIN SERPL-MCNC: 2.9 G/DL (ref 3.5–5.2)
ALBUMIN/GLOB SERPL: 0.9 G/DL
ALP SERPL-CCNC: 131 U/L (ref 39–117)
ALT SERPL W P-5'-P-CCNC: 13 U/L (ref 1–33)
ANION GAP SERPL CALCULATED.3IONS-SCNC: 11.1 MMOL/L (ref 5–15)
AST SERPL-CCNC: 14 U/L (ref 1–32)
BILIRUB BLD-MCNC: NEGATIVE MG/DL
BILIRUB SERPL-MCNC: 0.3 MG/DL (ref 0–1.2)
BUN SERPL-MCNC: 8 MG/DL (ref 6–20)
BUN/CREAT SERPL: 10.7 (ref 7–25)
CALCIUM SPEC-SCNC: 8.4 MG/DL (ref 8.6–10.5)
CHLORIDE SERPL-SCNC: 106 MMOL/L (ref 98–107)
CLARITY, POC: CLEAR
CO2 SERPL-SCNC: 18.9 MMOL/L (ref 22–29)
COLOR UR: YELLOW
CREAT SERPL-MCNC: 0.75 MG/DL (ref 0.57–1)
CREAT UR-MCNC: 67 MG/DL
DEPRECATED RDW RBC AUTO: 41 FL (ref 37–54)
EGFRCR SERPLBLD CKD-EPI 2021: 114.9 ML/MIN/1.73
ERYTHROCYTE [DISTWIDTH] IN BLOOD BY AUTOMATED COUNT: 12.9 % (ref 12.3–15.4)
GLOBULIN UR ELPH-MCNC: 3.4 GM/DL
GLUCOSE SERPL-MCNC: 100 MG/DL (ref 65–99)
GLUCOSE UR STRIP-MCNC: NEGATIVE MG/DL
HCT VFR BLD AUTO: 43 % (ref 34–46.6)
HGB BLD-MCNC: 13.8 G/DL (ref 12–15.9)
KETONES UR QL: ABNORMAL
LEUKOCYTE EST, POC: NEGATIVE
MCH RBC QN AUTO: 28.2 PG (ref 26.6–33)
MCHC RBC AUTO-ENTMCNC: 32.1 G/DL (ref 31.5–35.7)
MCV RBC AUTO: 87.8 FL (ref 79–97)
NITRITE UR-MCNC: NEGATIVE MG/ML
PH UR: 7.5 [PH] (ref 5–8)
PLATELET # BLD AUTO: 266 10*3/MM3 (ref 140–450)
PMV BLD AUTO: 9.2 FL (ref 6–12)
POTASSIUM SERPL-SCNC: 4 MMOL/L (ref 3.5–5.2)
PROT ?TM UR-MCNC: 10.5 MG/DL
PROT SERPL-MCNC: 6.3 G/DL (ref 6–8.5)
PROT UR STRIP-MCNC: NEGATIVE MG/DL
PROT/CREAT UR: 0.16 MG/G{CREAT}
RBC # BLD AUTO: 4.9 10*6/MM3 (ref 3.77–5.28)
RBC # UR STRIP: NEGATIVE /UL
SODIUM SERPL-SCNC: 136 MMOL/L (ref 136–145)
SP GR UR: 1.01 (ref 1–1.03)
UROBILINOGEN UR QL: NORMAL
WBC NRBC COR # BLD AUTO: 17.13 10*3/MM3 (ref 3.4–10.8)

## 2025-02-09 PROCEDURE — 80053 COMPREHEN METABOLIC PANEL: CPT | Performed by: OBSTETRICS & GYNECOLOGY

## 2025-02-09 PROCEDURE — G0463 HOSPITAL OUTPT CLINIC VISIT: HCPCS

## 2025-02-09 PROCEDURE — 84156 ASSAY OF PROTEIN URINE: CPT | Performed by: OBSTETRICS & GYNECOLOGY

## 2025-02-09 PROCEDURE — 85027 COMPLETE CBC AUTOMATED: CPT | Performed by: OBSTETRICS & GYNECOLOGY

## 2025-02-09 PROCEDURE — 82570 ASSAY OF URINE CREATININE: CPT | Performed by: OBSTETRICS & GYNECOLOGY

## 2025-02-09 PROCEDURE — 59025 FETAL NON-STRESS TEST: CPT

## 2025-02-09 PROCEDURE — 81002 URINALYSIS NONAUTO W/O SCOPE: CPT | Performed by: OBSTETRICS & GYNECOLOGY

## 2025-02-09 RX ORDER — ONDANSETRON 4 MG/1
4 TABLET, ORALLY DISINTEGRATING ORAL EVERY 6 HOURS PRN
Status: DISCONTINUED | OUTPATIENT
Start: 2025-02-09 | End: 2025-02-09 | Stop reason: HOSPADM

## 2025-02-09 RX ORDER — ONDANSETRON 4 MG/1
4 TABLET, ORALLY DISINTEGRATING ORAL EVERY 8 HOURS PRN
Qty: 15 TABLET | Refills: 0 | Status: SHIPPED | OUTPATIENT
Start: 2025-02-09

## 2025-02-09 NOTE — OBED NOTES
RACHAEL Davies  Obstetric History and Physical    Chief Complaint   Patient presents with    Non-stress Test    Vomiting During Pregnancy    Diarrhea       Subjective     HPI:    Patient is a 24 y.o. female  currently at 33w3d, who presents with nausea, vomiting, and diarrhea that started at approximately 3 AM this morning.  Patient reports she has had 2 episodes of emesis and 1 loose stool.  Patient states she was able to tolerate p.o. prior to 3 AM.  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  Patient does report subjective fevers.    Patient denies headaches, denies vision changes, and denies right upper quadrant/epigastric pain.    Patient reports good fetal movement, denies vaginal bleeding, denies leakage of fluid, and reports lower abdominal cramping.  Patient states she was worried that the cramping was indicative of  labor, and that is what prompted her to come in.    PNC provided by:  [facility] [place]. Her prenatal care is complicated by   Patient Active Problem List   Diagnosis    Obesity in pregnancy, antepartum    Nausea and vomiting    Current every day vaping    Anxiety and depression    Supervision of other normal pregnancy, antepartum    Elevated blood pressure reading    Gastroenteritis     Her previous obstetric/gynecological history is noted for  1 prior miscarriage at 5 weeks in  .    The following portions of the patients history were reviewed and updated as appropriate:   current medications, allergies, past medical history, past surgical history, past family history, past social history and current problem list.     Prenatal Information:  Prenatal Results       Initial Prenatal Labs       Test Value Reference Range Date Time    Hemoglobin  13.5 g/dL 12.0 - 15.9 24 09    Hematocrit  41.0 % 34.0 - 46.6 24    Platelets  300 10*3/mm3 140 - 450 24    Rubella IgG  1.21 index Immune >0.99 24    Hepatitis B  SAg  Non-Reactive  Non-Reactive 09/17/24 0926    Hepatitis C Ab  Non-Reactive  Non-Reactive 09/17/24 0926    RPR  Non Reactive  Non Reactive 12/06/24 1607       Non-Reactive  Non-Reactive 09/17/24 0926    T. Pallidum Ab         ABO  AB   09/17/24 0926    Rh  Positive   09/17/24 0926    Antibody Screen  Negative   09/17/24 0926    HIV  Non-Reactive  Non-Reactive 09/17/24 0926    Urine Culture  No growth   09/09/24 1137    Gonorrhea  Not Detected  Not Detected  09/09/24 1137    Chlamydia  Not Detected  Not Detected  09/09/24 1137    TSH        HgB A1c         Varicella IgG        Hemoglobinopathy Fractionation        Hemoglobinopathy (genetic testing)        Cystic fibrosis         Spinal muscular atrophy        Fragile X                  Fetal testing        Test Value Reference Range Date Time    NIPT        MSAFP        AFP-4                  2nd and 3rd Trimester       Test Value Reference Range Date Time    Hemoglobin (repeated)  13.8 g/dL 12.0 - 15.9 02/09/25 0834       12.4 g/dL 12.0 - 15.9 12/06/24 1607    Hematocrit (repeated)  43.0 % 34.0 - 46.6 02/09/25 0834       37.3 % 34.0 - 46.6 12/06/24 1607    Platelets   266 10*3/mm3 140 - 450 02/09/25 0834       313 10*3/mm3 140 - 450 12/06/24 1607       300 10*3/mm3 140 - 450 09/17/24 0926    1 hour GTT   111 mg/dL 65 - 139 12/06/24 1607    Antibody Screen (repeated)        3rd TM syphilis scrn (repeated)  RPR         3rd TM syphilis scrn (repeated) TP-Ab        3rd TM syphilis screen TB-Ab (FTA)        Syphilis cascade test TP-Ab (EIA)        Syphilis cascade TPPA        GTT Fasting        GTT 1 Hr        GTT 2 Hr        GTT 3 Hr        Group B Strep                  Other testing        Test Value Reference Range Date Time    Parvo IgG         CMV IgG                   Drug Screening       Test Value Reference Range Date Time    Amphetamine Screen        Barbiturate Screen  Negative  Negative 09/09/24 1137    Benzodiazepine Screen  Negative  Negative 09/09/24  1137    Methadone Screen  Negative  Negative 09/09/24 1137    Phencyclidine Screen        Opiates Screen  Negative  Negative 09/09/24 1137    THC Screen  Negative  Negative 09/09/24 1137    Cocaine Screen  Negative  Negative 09/09/24 1137    Propoxyphene Screen        Buprenorphine Screen        Methamphetamine Screen        Oxycodone Screen  Negative  Negative 09/09/24 1137    Tricyclic Antidepressants Screen                  Legend    ^: Historical                          External Prenatal Results       Pregnancy Outside Results - Transcribed From Office Records - See Scanned Records For Details       Test Value Date Time    ABO  AB  09/17/24 0926    Rh  Positive  09/17/24 0926    Antibody Screen  Negative  09/17/24 0926    Varicella IgG       Rubella  1.21 index 09/17/24 0926    Hgb  13.8 g/dL 02/09/25 0834       12.4 g/dL 12/06/24 1607       13.5 g/dL 09/17/24 0926    Hct  43.0 % 02/09/25 0834       37.3 % 12/06/24 1607       41.0 % 09/17/24 0926    HgB A1c        1h GTT  111 mg/dL 12/06/24 1607    3h GTT Fasting       3h GTT 1 hour       3h GTT 2 hour       3h GTT 3 hour        Gonorrhea (discrete)  Not Detected  09/09/24 1137    Chlamydia (discrete)  Not Detected  09/09/24 1137    RPR  Non Reactive  12/06/24 1607       Non-Reactive  09/17/24 0926    Syphils cascade: TP-Ab (FTA)       TP-Ab       TP-Ab (EIA)       TPPA       HBsAg  Non-Reactive  09/17/24 0926    Herpes Simplex Virus PCR       Herpes Simplex VIrus Culture       HIV  Non-Reactive  09/17/24 0926    Hep C RNA Quant PCR       Hep C Antibody  Non-Reactive  09/17/24 0926    AFP       NIPT       Cystic Fibrosis ([facility])       Cystic Fibroisis        Spinal Muscular atrophy       Fragile X       Group B Strep       GBS Susceptibility to Clindamycin       GBS Susceptibility to Erythromycin       Fetal Fibronectin       Genetic Testing, Maternal Blood                 Drug Screening       Test Value Date Time    Urine Drug Screen       Amphetamine Screen        Barbiturate Screen  Negative  24 1137    Benzodiazepine Screen  Negative  24 1137    Methadone Screen  Negative  24 1137    Phencyclidine Screen       Opiates Screen  Negative  24 1137    THC Screen  Negative  24 1137    Cocaine Screen       Propoxyphene Screen       Buprenorphine Screen       Methamphetamine Screen       Oxycodone Screen  Negative  24 1137    Tricyclic Antidepressants Screen                 Legend    ^: Historical                             Past OB History:     OB History    Para Term  AB Living   2 0 0 0 1 0   SAB IAB Ectopic Molar Multiple Live Births   1 0 0 0 0 0      # Outcome Date GA Lbr Juarez/2nd Weight Sex Type Anes PTL Lv   2 Current            1 SAB 24 5w0d              Past Medical History: Past Medical History:   Diagnosis Date    Anxiety and depression 2024    Chlamydia     Chronic fatigue syndrome 2023    Chronic idiopathic constipation 2023    SAB (spontaneous ) 2024    Completed  2024 8weeks by LMP, SAB, no D+C        Past Surgical History Past Surgical History:   Procedure Laterality Date    ANKLE SURGERY Right       Family History: Family History   Problem Relation Age of Onset    Hypertension Father     Breast cancer Neg Hx     Ovarian cancer Neg Hx     Uterine cancer Neg Hx     Colon cancer Neg Hx     Diabetes Neg Hx     Deep vein thrombosis Neg Hx     Pulmonary embolism Neg Hx       Social History:  reports that she has never smoked. She has never used smokeless tobacco.   reports no history of alcohol use.   reports no history of drug use.        General ROS: Pertinent items are noted in HPI    Home Medications:  Hydrocortisone Ace-Pramoxine, Prenatal Multivitamin + DHA, nicotine, and ondansetron ODT    Allergies:  Allergies   Allergen Reactions    Coconut Unknown - Low Severity       Objective       Vital Signs Range for the last 24 hours  Temperature: Temp:  [98.8 °F (37.1 °C)]  98.8 °F (37.1 °C)   Temp Source: Temp src: Oral   BP: BP: (129-151)/(63-96) 135/70   Pulse: Heart Rate:  [98] 98   Respirations: Resp:  [18] 18   SPO2: SpO2:  [98 %-99 %] 99 %     Physical Examination:   General appearance - alert, well appearing, and in no distress  Mental status - alert, oriented to person, place, and time  Abdomen - soft, gravid, minimally tender to palpation in lower abdomen, no rebound, no guarding, no pain in upper abdomen  Pelvic - normal external genitalia, vulva, vagina, cervix, and uterus   Back exam - full range of motion, no tenderness or pain on motion  Neurological - alert, oriented, normal speech  Extremities - peripheral pulses normal  Skin - normal coloration and turgor, no suspicious skin lesions noted    Presentation: Cephalic per bedside sono performed in triage   Cervix: Method: sterile vaginal exam performed   Dilation: Cervical Dilation (cm): 0   Effacement: Cervical Effacement: 0   Station:         Fetal Heart Rate Assessment   Method: Fetal HR Assessment Method: external   Beats/min: Fetal HR (beats/min): 140   Baseline: Fetal HR Baseline: normal range   Variability: Fetal HR Variability: moderate (amplitude range 6 to 25 bpm)   Accels: Fetal HR Accelerations: lasting at least 15 seconds   Decels: Fetal HR Decelerations: absent   Tracing Category:       Uterine Assessment   Method: Method: external tocotransducer, palpation   Frequency (min): Contraction Frequency (Minutes): OCCASIONAL (PT NOT FEELING ANY CONTRACTIONS)   Ctx Count in 10 min:     Duration:     Intensity: Contraction Intensity: mild by palpation   [place] Units:       GBS is unknown     Bedside sono: Fetus in cephalic presentation, RITA 11.81 cm, MVP 3.78 cm, fetal breathing movements noted      Results for orders placed or performed during the hospital encounter of 02/09/25   Protein / Creatinine Ratio, Urine - Urine, Clean Catch    Specimen: Urine, Clean Catch   Result Value Ref Range    Creatinine, Urine  67.0 mg/dL    Total Protein, Urine 10.5 mg/dL    Protein/Creatinine Ratio, Urine 0.16    CBC (No Diff)    Specimen: Arm, Left; Blood   Result Value Ref Range    WBC 17.13 (H) 3.40 - 10.80 10*3/mm3    RBC 4.90 3.77 - 5.28 10*6/mm3    Hemoglobin 13.8 12.0 - 15.9 g/dL    Hematocrit 43.0 34.0 - 46.6 %    MCV 87.8 79.0 - 97.0 fL    MCH 28.2 26.6 - 33.0 pg    MCHC 32.1 31.5 - 35.7 g/dL    RDW 12.9 12.3 - 15.4 %    RDW-SD 41.0 37.0 - 54.0 fl    MPV 9.2 6.0 - 12.0 fL    Platelets 266 140 - 450 10*3/mm3   POC Urinalysis Dipstick    Specimen: Urine   Result Value Ref Range    Color Yellow Yellow, Straw, Dark Yellow, Lucrecia    Clarity, UA Clear Clear    Glucose, UA Negative Negative mg/dL    Bilirubin Negative Negative    Ketones, UA 15 mg/dL (A) Negative    Specific Gravity  1.015 1.005 - 1.030    Blood, UA Negative Negative    pH, Urine 7.5 5.0 - 8.0    Protein, POC Negative Negative mg/dL    Urobilinogen, UA Normal Normal, 0.2 E.U./dL    Leukocytes Negative Negative    Nitrite, UA Negative Negative   Comprehensive Metabolic Panel    Specimen: Arm, Left; Blood   Result Value Ref Range    Glucose 100 (H) 65 - 99 mg/dL    BUN 8 6 - 20 mg/dL    Creatinine 0.75 0.57 - 1.00 mg/dL    Sodium 136 136 - 145 mmol/L    Potassium 4.0 3.5 - 5.2 mmol/L    Chloride 106 98 - 107 mmol/L    CO2 18.9 (L) 22.0 - 29.0 mmol/L    Calcium 8.4 (L) 8.6 - 10.5 mg/dL    Total Protein 6.3 6.0 - 8.5 g/dL    Albumin 2.9 (L) 3.5 - 5.2 g/dL    ALT (SGPT) 13 1 - 33 U/L    AST (SGOT) 14 1 - 32 U/L    Alkaline Phosphatase 131 (H) 39 - 117 U/L    Total Bilirubin 0.3 0.0 - 1.2 mg/dL    Globulin 3.4 gm/dL    A/G Ratio 0.9 g/dL    BUN/Creatinine Ratio 10.7 7.0 - 25.0    Anion Gap 11.1 5.0 - 15.0 mmol/L    eGFR 114.9 >60.0 mL/min/1.73   Results for orders placed or performed in visit on 01/30/25   POC Urinalysis Dipstick    Specimen: Urine   Result Value Ref Range    Glucose, UA Negative Negative mg/dL    Protein, POC Negative Negative mg/dL   Results for orders  placed or performed in visit on 01/16/25   POC Urinalysis Dipstick    Specimen: Urine   Result Value Ref Range    Glucose, UA Negative Negative mg/dL    Protein, POC Negative Negative mg/dL   Results for orders placed or performed in visit on 12/31/24   POC Urinalysis Dipstick    Specimen: Urine   Result Value Ref Range    Glucose, UA Negative Negative mg/dL    Protein, POC Negative Negative mg/dL   Results for orders placed or performed in visit on 12/20/24   POC Urinalysis Dipstick    Specimen: Urine   Result Value Ref Range    Glucose, UA Negative Negative mg/dL    Protein, POC Negative Negative mg/dL   Results for orders placed or performed in visit on 12/06/24   Gestational Diabetes Screen 1 Hour    Specimen: Arm, Left; Blood   Result Value Ref Range    Glucose Gestational Screen 111 65 - 139 mg/dL   RPR, Rfx Qn RPR / Confirm TP    Specimen: Arm, Left; Blood   Result Value Ref Range    RPR Non Reactive Non Reactive   CBC (No Diff)    Specimen: Arm, Left; Blood   Result Value Ref Range    WBC 12.65 (H) 3.40 - 10.80 10*3/mm3    RBC 4.25 3.77 - 5.28 10*6/mm3    Hemoglobin 12.4 12.0 - 15.9 g/dL    Hematocrit 37.3 34.0 - 46.6 %    MCV 87.8 79.0 - 97.0 fL    MCH 29.2 26.6 - 33.0 pg    MCHC 33.2 31.5 - 35.7 g/dL    RDW 11.9 (L) 12.3 - 15.4 %    RDW-SD 37.7 37.0 - 54.0 fl    MPV 10.0 6.0 - 12.0 fL    Platelets 313 140 - 450 10*3/mm3   POC Urinalysis Dipstick    Specimen: Urine   Result Value Ref Range    Glucose, UA Negative Negative mg/dL    Protein, POC Negative Negative mg/dL   Results for orders placed or performed in visit on 11/05/24   POC Urinalysis Dipstick    Specimen: Urine   Result Value Ref Range    Glucose, UA Negative Negative mg/dL    Protein, POC Negative Negative mg/dL   Results for orders placed or performed in visit on 10/07/24   POC Urinalysis Dipstick    Specimen: Urine   Result Value Ref Range    Glucose, UA Negative Negative mg/dL    Protein, POC Negative Negative mg/dL   Results for orders  placed or performed in visit on 09/17/24   [facility] Prenatal Test: Chromosomes 13, 18, 21, X & Y: Triploidy 22Q.11.2 Deletion - Blood, Blood, Venous    Specimen: Blood, Venous   Result Value Ref Range    REPORT SUMMARY LOW RISK     REPORT NOTE See Notes     GENDER OF FETUS Male     FETAL FRACTION 4.9%     TRISOMY 21 RESULT TEXT Low Risk     TRISOMY 21 AGE-BASED RISK TEXT 1/1,068 (0.09%)     TRISOMY 21 RISK SCORE TEXT <1/10,000 (<0.01%)     TRISOMY 18 RESULT TEXT Low Risk     TRISOMY 18 AGE-BASED RISK TEXT 1/2,484 (0.04%)     TRISOMY 18 RISK SCORE TEXT <1/10,000 (<0.01%)     TRISOMY 13 RESULT TEXT Low Risk     TRISOMY 13 AGE-BASED RISK TEXT 1/7,826 (0.01%)     TRISOMY 13 RISK SCORE TEXT <1/10,000 (<0.01%)     MONOSOMY X RESULT TEXT Low Risk     MONOSOMY X AGE-BASED RISK TEXT 1/255 (0.39%)     MONOSOMY X RISK SCORE TEXT <1/10,000 (<0.01%)     TRIPLOIDY RESULT TEXT Low Risk     22Q11.2 DELETION SYNDROME RESULT TEXT Low Risk     22Q11.2 DELETION SYNDROME POPULATION-BASED RISK TEXT 1/2,000     22Q11.2 DELETION SYNDROME RISK SCORE TEXT 1/3,100     FOOTNOTES See Notes    Results for orders placed or performed in visit on 09/09/24   PAP, Liquid Based ([facility] Only)    Specimen: ThinPrep Vial   Result Value Ref Range    Diagnosis Comment     Specimen adequacy: Comment     Clinician Provided ICD-10: Comment     Performed by: Comment     . .     Note: Comment    OB Panel Type & Screen    Specimen: Arm, Left; Blood   Result Value Ref Range    ABO Type AB     RH type Positive     Antibody Screen Negative      *Note: Due to a large number of results and/or encounters for the requested time period, some results have not been displayed. A complete set of results can be found in Results Review.         Assessment & Plan       Gastroenteritis  Gestational Hypertension      Assessment:  1.  Intrauterine pregnancy at 33w3d gestation with reactive, reassuring, fetal status, with some variable decelerations present   2.  Likely  viral gastroenteritis  3.  Obstetrical history significant for  1 prior miscarriage at 5 weeks in  .  4.  No evidence of  labor  5. Gestational hypertension    Plan:  1. 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  - Electrolytes normal, ALT and AST normal  - Urine dip with small ketones  - WBC count elevated at 17K - this is likely due to viral gastroenteritis and pregnancy - patient afebrile in triage    2.  Gestational hypertension  - Patient noted to have mild range blood pressure in the office on 2024 (at 24 weeks EGA)  - Patient with several mild range blood pressures in triage today  - Patient without signs or symptoms of preeclampsia  - Hemoglobin 13.8, platelets 266K, creatinine 0.75, ALT 13, AST 14, and urine P: C = 0.16    Strict  labor precautions, fetal kick counts, and preeclampsia precautions reviewed with patient.  In addition, dehydration precautions were also reviewed with patient.    Patient has follow-up appointment with Dr. Billy on 2025 which she was instructed to keep.     Plan of care has been reviewed with patient and patient agrees.   Risks, benefits of treatment plan have been discussed.  All questions have been answered.        Electronically signed by Ivelisse Samuels MD, 25, 10:33 AM EST.

## 2025-02-09 NOTE — NON STRESS TEST
Obstetrical Non-stress Test Interpretation     Name:  Jennifer Francis  MRN: [id number]    24 y.o. female  at 33w3d    Indication: NST/NAUSEA/VOMITING      Fetal Assessment  Fetal Movement: active  Fetal HR Assessment Method: external  Fetal HR (beats/min): 140  Fetal HR Baseline: normal range  Fetal HR Variability: moderate (amplitude range 6 to 25 bpm)  Fetal HR Accelerations: lasting at least 15 seconds  Fetal HR Decelerations: absent    /70 (BP Location: Right arm, Patient Position: Lying)   Pulse 98   Temp 98.8 °F (37.1 °C) (Oral)   Resp 18   LMP 2024   SpO2 99%     Reason for test: OB Triage (NST/NAUSEA AND VOMITING)  Date of Test: 2025  Time frame of test:   RN NST Interpretation: Reactive      Neetu Colon RN  2025  10:43 EST

## 2025-02-11 ENCOUNTER — ROUTINE PRENATAL (OUTPATIENT)
Dept: OBSTETRICS AND GYNECOLOGY | Facility: CLINIC | Age: 25
End: 2025-02-11
Payer: MEDICAID

## 2025-02-11 VITALS — DIASTOLIC BLOOD PRESSURE: 78 MMHG | BODY MASS INDEX: 45.73 KG/M2 | WEIGHT: 250 LBS | SYSTOLIC BLOOD PRESSURE: 127 MMHG

## 2025-02-11 DIAGNOSIS — O99.210 OBESITY IN PREGNANCY, ANTEPARTUM: ICD-10-CM

## 2025-02-11 DIAGNOSIS — Z34.80 SUPERVISION OF OTHER NORMAL PREGNANCY, ANTEPARTUM: Primary | ICD-10-CM

## 2025-02-11 LAB
GLUCOSE UR STRIP-MCNC: NEGATIVE MG/DL
PROT UR STRIP-MCNC: NEGATIVE MG/DL

## 2025-02-17 ENCOUNTER — PATIENT OUTREACH (OUTPATIENT)
Dept: LABOR AND DELIVERY | Facility: HOSPITAL | Age: 25
End: 2025-02-17
Payer: MEDICAID

## 2025-02-17 NOTE — OUTREACH NOTE
[facility]  Check-In    Current Estimated Gestational Age: 34w4d      Questions/Answers      Flowsheet Row Responses   Best Method for Contacting Cell   Demographics Reviewed Yes   Currently Employed Yes   Able to keep appointments as scheduled Yes   Baby Active/Feeling Fetal Movemen Yes   How are you presently feeling? ok   Questions regarding prenatal visits or tests to be ordered? No   Supplies ready for baby Car Seat, Clothing, Crib, Diapers, Feeding Supplies   Resource/Environmental Concerns None   Do you have any questions related to your care experience, your pregnancy, plans for delivery, any concerns, etc? No   Other Education Other   Other Education Comment s/s to come in to labor delivery            Discussed NST, how to prepare and what to expect. Doing well. Encouraged to reach out for any questions, needs or concerns.         Danni Zimmer RN  Maternity Nurse Navigator    2/17/2025, 14:19 EST

## 2025-02-18 ENCOUNTER — HOSPITAL ENCOUNTER (OUTPATIENT)
Facility: HOSPITAL | Age: 25
Discharge: HOME | End: 2025-02-18
Attending: OBSTETRICS & GYNECOLOGY | Admitting: OBSTETRICS & GYNECOLOGY
Payer: MEDICAID

## 2025-02-18 ENCOUNTER — HOSPITAL ENCOUNTER (OUTPATIENT)
Dept: LABOR AND DELIVERY | Facility: HOSPITAL | Age: 25
Discharge: HOME | End: 2025-02-18
Payer: MEDICAID

## 2025-02-18 VITALS — HEART RATE: 82 BPM | DIASTOLIC BLOOD PRESSURE: 83 MMHG | SYSTOLIC BLOOD PRESSURE: 139 MMHG

## 2025-02-18 PROCEDURE — 59025 FETAL NON-STRESS TEST: CPT

## 2025-02-18 NOTE — SIGNIFICANT NOTE
02/18/25 1735   Nonstress Test   Reason for NST Other (Comment)  (Obesity, tobacco abuse)   Acoustic Stimulator No   Uterine Irritability No   Contractions Not present   Fetal Assessment   Fetal Movement active   Fetal HR Assessment Method external   Fetal HR (beats/min) 120  (120s)   Fetal HR Baseline normal range   Fetal HR Accelerations episodic;greater than/equal to 15 bpm;lasting at least 15 seconds   Fetal HR Decelerations absent   Sinusoidal Pattern Present absent   Fetal HR Tracing Category Category I   Interpretation A   Nonstress Test Interpretation A Reactive

## 2025-02-18 NOTE — NON STRESS TEST
Obstetrical Non-stress Test Interpretation     Name:  Jennifer Francis  MRN: [id number]    24 y.o. female  at 34w5d    Indication: NST- Obesity      Fetal Assessment  Fetal Movement: active  Fetal HR Assessment Method: external  Fetal HR (beats/min): 125  Fetal HR Baseline: normal range  Fetal HR Variability: moderate (amplitude range 6 to 25 bpm)  Fetal HR Accelerations: episodic, greater than/equal to 15 bpm, lasting at least 15 seconds  Fetal HR Decelerations: absent  Sinusoidal Pattern Present: absent    /83   Pulse 82   LMP 2024     Reason for test: Other (Comment) (NST- Obesity)  Date of Test: 2025  Time frame of test: 9552-7704  RN NST Interpretation: Reactive      Clementina Lyn RN  2025  14:49 EST

## 2025-02-24 ENCOUNTER — HOSPITAL ENCOUNTER (OUTPATIENT)
Facility: HOSPITAL | Age: 25
Discharge: HOME | End: 2025-02-24
Attending: OBSTETRICS & GYNECOLOGY | Admitting: OBSTETRICS & GYNECOLOGY
Payer: MEDICAID

## 2025-02-24 ENCOUNTER — HOSPITAL ENCOUNTER (OUTPATIENT)
Dept: LABOR AND DELIVERY | Facility: HOSPITAL | Age: 25
Discharge: HOME | End: 2025-02-24
Payer: MEDICAID

## 2025-02-24 VITALS
HEART RATE: 73 BPM | DIASTOLIC BLOOD PRESSURE: 76 MMHG | RESPIRATION RATE: 18 BRPM | SYSTOLIC BLOOD PRESSURE: 136 MMHG | OXYGEN SATURATION: 98 %

## 2025-02-24 DIAGNOSIS — O13.3 GESTATIONAL HYPERTENSION, THIRD TRIMESTER: ICD-10-CM

## 2025-02-24 DIAGNOSIS — O99.210 OBESITY IN PREGNANCY, ANTEPARTUM: Primary | ICD-10-CM

## 2025-02-24 LAB
ALBUMIN SERPL-MCNC: 2.8 G/DL (ref 3.5–5.2)
ALBUMIN/GLOB SERPL: 1 G/DL
ALP SERPL-CCNC: 140 U/L (ref 39–117)
ALT SERPL W P-5'-P-CCNC: 13 U/L (ref 1–33)
ANION GAP SERPL CALCULATED.3IONS-SCNC: 11.2 MMOL/L (ref 5–15)
AST SERPL-CCNC: 15 U/L (ref 1–32)
BILIRUB SERPL-MCNC: 0.2 MG/DL (ref 0–1.2)
BUN SERPL-MCNC: 7 MG/DL (ref 6–20)
BUN/CREAT SERPL: 10 (ref 7–25)
CALCIUM SPEC-SCNC: 8.7 MG/DL (ref 8.6–10.5)
CHLORIDE SERPL-SCNC: 108 MMOL/L (ref 98–107)
CO2 SERPL-SCNC: 18.8 MMOL/L (ref 22–29)
CREAT SERPL-MCNC: 0.7 MG/DL (ref 0.57–1)
CREAT UR-MCNC: 116.4 MG/DL
DEPRECATED RDW RBC AUTO: 40.7 FL (ref 37–54)
EGFRCR SERPLBLD CKD-EPI 2021: 124.8 ML/MIN/1.73
ERYTHROCYTE [DISTWIDTH] IN BLOOD BY AUTOMATED COUNT: 13.1 % (ref 12.3–15.4)
GLOBULIN UR ELPH-MCNC: 2.9 GM/DL
GLUCOSE SERPL-MCNC: 115 MG/DL (ref 65–99)
HCT VFR BLD AUTO: 37.3 % (ref 34–46.6)
HGB BLD-MCNC: 12.3 G/DL (ref 12–15.9)
MCH RBC QN AUTO: 28.7 PG (ref 26.6–33)
MCHC RBC AUTO-ENTMCNC: 33 G/DL (ref 31.5–35.7)
MCV RBC AUTO: 86.9 FL (ref 79–97)
PLATELET # BLD AUTO: 257 10*3/MM3 (ref 140–450)
PMV BLD AUTO: 9.8 FL (ref 6–12)
POTASSIUM SERPL-SCNC: 3.4 MMOL/L (ref 3.5–5.2)
PROT ?TM UR-MCNC: 15 MG/DL
PROT SERPL-MCNC: 5.7 G/DL (ref 6–8.5)
PROT/CREAT UR: 0.13 MG/G{CREAT}
RBC # BLD AUTO: 4.29 10*6/MM3 (ref 3.77–5.28)
SODIUM SERPL-SCNC: 138 MMOL/L (ref 136–145)
WBC NRBC COR # BLD AUTO: 14.51 10*3/MM3 (ref 3.4–10.8)

## 2025-02-24 PROCEDURE — G0463 HOSPITAL OUTPT CLINIC VISIT: HCPCS

## 2025-02-24 PROCEDURE — 84156 ASSAY OF PROTEIN URINE: CPT | Performed by: OBSTETRICS & GYNECOLOGY

## 2025-02-24 PROCEDURE — 82570 ASSAY OF URINE CREATININE: CPT | Performed by: OBSTETRICS & GYNECOLOGY

## 2025-02-24 PROCEDURE — 59025 FETAL NON-STRESS TEST: CPT

## 2025-02-24 PROCEDURE — 85027 COMPLETE CBC AUTOMATED: CPT | Performed by: OBSTETRICS & GYNECOLOGY

## 2025-02-24 PROCEDURE — 80053 COMPREHEN METABOLIC PANEL: CPT | Performed by: OBSTETRICS & GYNECOLOGY

## 2025-02-24 NOTE — NON STRESS TEST
Obstetrical Non-stress Test Interpretation     Name:  Jennifer Francis  MRN: [id number]    24 y.o. female  at 35w4d    Indication: NST/ELEVATED BMI      Fetal Assessment  Fetal Movement: active  Fetal HR Assessment Method: external  Fetal HR (beats/min): 125  Fetal HR Baseline: normal range  Fetal HR Variability: moderate (amplitude range 6 to 25 bpm)  Fetal HR Accelerations: lasting at least 15 seconds  Fetal HR Decelerations: absent    /76 (BP Location: Right arm, Patient Position: Sitting)   Pulse 73   Resp 18   LMP 2024   SpO2 98%     Reason for test: OB Triage (NST/ELEVATED BMI)  Date of Test: 2025  Time frame of test: 1535-5881  RN NST Interpretation: Reactive      Neetu Colon RN  2025  15:52 EST

## 2025-02-24 NOTE — NURSING NOTE
Discharge order received. Pt instructed to call her doctor or come to hospital if she experiences decrease fetal movement, blurred vision,flashy lights, headache, epigastric pain or discomfort, something doesn't feel right or flu like symptoms. Pt verbalized understanding and denied any questions at time of discharge.

## 2025-02-25 NOTE — PROGRESS NOTES
OB FOLLOW UP      Chief Complaint   Patient presents with    Routine Prenatal Visit     Subjective:   No complaints.  Denies VB, leaking fluid, current regular cramping or contractions.  Denies HA, vision changes/scotomata, RUQ/epigastric pain or facial edema.  Hands swollen for a month.   Right hand occas numb and tingling none now.     Objective:  /80   Wt 119 kg (262 lb)   LMP 06/20/2024   BMI 47.92 kg/m²  24 kg (53 lb) Body mass index is 47.92 kg/m².  Chaperone present during pelvic exam if performed.  See OB flow sheet for fundal height (not performed if US day of OV), FHT, edema, cvx exam if performed, and Upro/Uglu.   DTR +2 patellar, achilles no clonus  GBS RV swab performed today    Assessment and Plan:  36w0d     Diagnoses and all orders for this visit:    1. Supervision of other normal pregnancy, antepartum (Primary)  Overview:  BALJINDER finalized: 3/27/2025 by LMP and 6-week ultrasound    NIPS neg XY.  Declined CF/SMA.  Declined AFP     COVID: Recommended, declines  Flu:  Recommended, declines  Tdap:  Vaccinated Jan 2025    1hr Glucola: 111  FU TPA w glucola: RPR NR  ? Desires Sterilization:  Declined     Anatomy US: [facility] 11/5/24 cwd, anterior placenta, breech, unable to see umb cord insertion on placenta- FU ordered  FU US:  MG 12/6/24 1#8oz 46%, AC 45%,  nl cord insertion, VTX  FU US: [facility] 1/30/25 3#15oz, 25%,  AC 13%, RITA 15, VTX,  gd 2 placenta. FU US ordered recheck growth   FU US: MG 2/27/25 5#9oz, 21%,  AC 12%,  RITA 13, VTX, gd 2 placenta      PROBLEM LIST/PLAN:   GHTN- see separate  Anxiety/depression-no medications.  Prepregnancy Effexor.  Stable  Obesity in pregnancy-pregravid BMI 39   US growth and APT  Daily vaping- Stopped in preg    Orders:  -     POC Urinalysis Dipstick  -     Group B Strep Molecular by PCR - Swab, Vaginal/Rectum; Future  -     Group B Strep Molecular by PCR - Swab, Vaginal/Rectum    2. Gestational hypertension, third trimester  Overview:  Biweek NST Mon Thu  US  growth  Del 37th week sooner prn mat/fetal indications   11Mar 37+5 IOL AP  HTN precautions, check BP at home to L+D for systolic 155 or higher or diastolic 100 or higher  2/27/2025 off work until delivery        Counseling:    OB precautions, leaking, VB, gerardo mckinnon vs PTL/Labor  [facility]  HTN precautions reviewed: HA, vision change, RUQ/epigastric pain, edema  CARPEL TUNNEL SYNDROME (CTS) in pregnancy discussed.  It is common in pregnancy, especially third trimester.  Physiologic changes in pregnancy reviewed.  I recommend she avoid foods (typically ethnic foods), spices, adding extra salt that can increase water retention and therefore edema.  Trial of OTC wrist splints at bedtime and/or continuously during the day are recommended for at least 1-2 month trial.  If it persists postpartum or worsens in pregnancy, we could consider eval w PCP for other treatment options and/or to refer for possible steroids (injections or oral) or surgical release.      Reassuring pregnancy progress. Continue PNV.      Return in about 1 week (around 3/6/2025) for FU OB q1wk to BALJINDER/Delivery.            Kesha Billy,   02/27/2025    [facility]  [facility] [place] [address]  Dept: [phone]  Dept Fax: [phone]  Loc: [phone]  Loc Fax: [phone]

## 2025-02-27 ENCOUNTER — HOSPITAL ENCOUNTER (OUTPATIENT)
Facility: HOSPITAL | Age: 25
Discharge: HOME | End: 2025-02-27
Attending: OBSTETRICS & GYNECOLOGY | Admitting: OBSTETRICS & GYNECOLOGY
Payer: MEDICAID

## 2025-02-27 ENCOUNTER — ROUTINE PRENATAL (OUTPATIENT)
Dept: OBSTETRICS AND GYNECOLOGY | Facility: CLINIC | Age: 25
End: 2025-02-27
Payer: MEDICAID

## 2025-02-27 ENCOUNTER — HOSPITAL ENCOUNTER (OUTPATIENT)
Dept: LABOR AND DELIVERY | Facility: HOSPITAL | Age: 25
Discharge: HOME | End: 2025-02-27
Payer: MEDICAID

## 2025-02-27 VITALS — HEART RATE: 67 BPM | DIASTOLIC BLOOD PRESSURE: 80 MMHG | SYSTOLIC BLOOD PRESSURE: 136 MMHG

## 2025-02-27 VITALS — SYSTOLIC BLOOD PRESSURE: 146 MMHG | DIASTOLIC BLOOD PRESSURE: 80 MMHG | BODY MASS INDEX: 47.92 KG/M2 | WEIGHT: 262 LBS

## 2025-02-27 DIAGNOSIS — Z34.80 SUPERVISION OF OTHER NORMAL PREGNANCY, ANTEPARTUM: Primary | ICD-10-CM

## 2025-02-27 DIAGNOSIS — O13.3 GESTATIONAL HYPERTENSION, THIRD TRIMESTER: ICD-10-CM

## 2025-02-27 LAB
GLUCOSE UR STRIP-MCNC: NEGATIVE MG/DL
PROT UR STRIP-MCNC: NEGATIVE MG/DL

## 2025-02-27 PROCEDURE — 59025 FETAL NON-STRESS TEST: CPT

## 2025-02-27 PROCEDURE — 87653 STREP B DNA AMP PROBE: CPT | Performed by: OBSTETRICS & GYNECOLOGY

## 2025-02-27 PROCEDURE — 59025 FETAL NON-STRESS TEST: CPT | Performed by: OBSTETRICS & GYNECOLOGY

## 2025-02-27 NOTE — NON STRESS TEST
Obstetrical Non-stress Test Interpretation     Name:  Jennifer Francis  MRN: [id number]    24 y.o. female  at 36w0d    Indication: Gestational HTN        Fetal Assessment  Fetal Movement: active  Fetal HR Assessment Method: external  Fetal HR (beats/min): 130  Fetal HR Baseline: normal range  Fetal HR Variability: moderate (amplitude range 6 to 25 bpm)  Fetal HR Accelerations: lasting at least 15 seconds, greater than/equal to 15 bpm  Fetal HR Decelerations: absent  Sinusoidal Pattern Present: absent    /80 (BP Location: Right arm, Patient Position: Sitting)   Pulse 67   LMP 2024     Reason for test: OB Triage, Hypertension  Date of Test: 2025  Time frame of test: 3490-4655  RN NST Interpretation: [place] To RN  2025  16:03 EST

## 2025-02-28 LAB — GP B STREP DNA VAG+RECTUM QL NAA+PROBE: POSITIVE

## 2025-03-03 ENCOUNTER — HOSPITAL ENCOUNTER (OUTPATIENT)
Dept: LABOR AND DELIVERY | Facility: HOSPITAL | Age: 25
Discharge: HOME | End: 2025-03-03
Payer: MEDICAID

## 2025-03-03 ENCOUNTER — HOSPITAL ENCOUNTER (OUTPATIENT)
Facility: HOSPITAL | Age: 25
Discharge: HOME | End: 2025-03-04
Attending: OBSTETRICS & GYNECOLOGY | Admitting: OBSTETRICS & GYNECOLOGY
Payer: MEDICAID

## 2025-03-03 PROCEDURE — G0463 HOSPITAL OUTPT CLINIC VISIT: HCPCS

## 2025-03-03 PROCEDURE — 84156 ASSAY OF PROTEIN URINE: CPT | Performed by: OBSTETRICS & GYNECOLOGY

## 2025-03-03 PROCEDURE — 80053 COMPREHEN METABOLIC PANEL: CPT | Performed by: OBSTETRICS & GYNECOLOGY

## 2025-03-03 PROCEDURE — 85025 COMPLETE CBC W/AUTO DIFF WBC: CPT | Performed by: OBSTETRICS & GYNECOLOGY

## 2025-03-03 PROCEDURE — 82570 ASSAY OF URINE CREATININE: CPT | Performed by: OBSTETRICS & GYNECOLOGY

## 2025-03-04 VITALS
RESPIRATION RATE: 14 BRPM | DIASTOLIC BLOOD PRESSURE: 82 MMHG | HEART RATE: 66 BPM | OXYGEN SATURATION: 98 % | SYSTOLIC BLOOD PRESSURE: 153 MMHG

## 2025-03-04 LAB
ALBUMIN SERPL-MCNC: 2.9 G/DL (ref 3.5–5.2)
ALBUMIN/GLOB SERPL: 1 G/DL
ALP SERPL-CCNC: 138 U/L (ref 39–117)
ALT SERPL W P-5'-P-CCNC: 14 U/L (ref 1–33)
ANION GAP SERPL CALCULATED.3IONS-SCNC: 9.2 MMOL/L (ref 5–15)
AST SERPL-CCNC: 16 U/L (ref 1–32)
BASOPHILS # BLD AUTO: 0.03 10*3/MM3 (ref 0–0.2)
BASOPHILS NFR BLD AUTO: 0.2 % (ref 0–1.5)
BILIRUB BLD-MCNC: NEGATIVE MG/DL
BILIRUB SERPL-MCNC: <0.2 MG/DL (ref 0–1.2)
BUN SERPL-MCNC: 9 MG/DL (ref 6–20)
BUN/CREAT SERPL: 11.4 (ref 7–25)
CALCIUM SPEC-SCNC: 9.2 MG/DL (ref 8.6–10.5)
CHLORIDE SERPL-SCNC: 109 MMOL/L (ref 98–107)
CLARITY, POC: CLEAR
CO2 SERPL-SCNC: 18.8 MMOL/L (ref 22–29)
COLOR UR: YELLOW
CREAT SERPL-MCNC: 0.79 MG/DL (ref 0.57–1)
CREAT UR-MCNC: 107.9 MG/DL
DEPRECATED RDW RBC AUTO: 40.7 FL (ref 37–54)
EGFRCR SERPLBLD CKD-EPI 2021: 107.9 ML/MIN/1.73
EOSINOPHIL # BLD AUTO: 0.04 10*3/MM3 (ref 0–0.4)
EOSINOPHIL NFR BLD AUTO: 0.3 % (ref 0.3–6.2)
ERYTHROCYTE [DISTWIDTH] IN BLOOD BY AUTOMATED COUNT: 13.2 % (ref 12.3–15.4)
GLOBULIN UR ELPH-MCNC: 2.9 GM/DL
GLUCOSE SERPL-MCNC: 120 MG/DL (ref 65–99)
GLUCOSE UR STRIP-MCNC: NEGATIVE MG/DL
HCT VFR BLD AUTO: 36.6 % (ref 34–46.6)
HGB BLD-MCNC: 12 G/DL (ref 12–15.9)
IMM GRANULOCYTES # BLD AUTO: 0.06 10*3/MM3 (ref 0–0.05)
IMM GRANULOCYTES NFR BLD AUTO: 0.4 % (ref 0–0.5)
KETONES UR QL: NEGATIVE
LEUKOCYTE EST, POC: ABNORMAL
LYMPHOCYTES # BLD AUTO: 1.92 10*3/MM3 (ref 0.7–3.1)
LYMPHOCYTES NFR BLD AUTO: 13 % (ref 19.6–45.3)
MCH RBC QN AUTO: 28.4 PG (ref 26.6–33)
MCHC RBC AUTO-ENTMCNC: 32.8 G/DL (ref 31.5–35.7)
MCV RBC AUTO: 86.7 FL (ref 79–97)
MONOCYTES # BLD AUTO: 1.11 10*3/MM3 (ref 0.1–0.9)
MONOCYTES NFR BLD AUTO: 7.5 % (ref 5–12)
NEUTROPHILS NFR BLD AUTO: 11.59 10*3/MM3 (ref 1.7–7)
NEUTROPHILS NFR BLD AUTO: 78.6 % (ref 42.7–76)
NITRITE UR-MCNC: POSITIVE MG/ML
NRBC BLD AUTO-RTO: 0 /100 WBC (ref 0–0.2)
PH UR: 6 [PH] (ref 5–8)
PLATELET # BLD AUTO: 251 10*3/MM3 (ref 140–450)
PMV BLD AUTO: 10.1 FL (ref 6–12)
POTASSIUM SERPL-SCNC: 3.8 MMOL/L (ref 3.5–5.2)
PROT ?TM UR-MCNC: 22.4 MG/DL
PROT SERPL-MCNC: 5.8 G/DL (ref 6–8.5)
PROT UR STRIP-MCNC: ABNORMAL MG/DL
PROT/CREAT UR: 0.21 MG/G{CREAT}
RBC # BLD AUTO: 4.22 10*6/MM3 (ref 3.77–5.28)
RBC # UR STRIP: ABNORMAL /UL
SODIUM SERPL-SCNC: 137 MMOL/L (ref 136–145)
SP GR UR: 1.02 (ref 1–1.03)
UROBILINOGEN UR QL: ABNORMAL
WBC NRBC COR # BLD AUTO: 14.75 10*3/MM3 (ref 3.4–10.8)

## 2025-03-04 PROCEDURE — 81002 URINALYSIS NONAUTO W/O SCOPE: CPT | Performed by: OBSTETRICS & GYNECOLOGY

## 2025-03-04 PROCEDURE — 59025 FETAL NON-STRESS TEST: CPT

## 2025-03-04 NOTE — OBED NOTES
RACHAEL Davies  Obstetric History and Physical    Chief Complaint   Patient presents with    Decreased Fetal Movement       Subjective     PNC provided by:  PARVIZ    HPI:    Patient is a 24 y.o. female  currently at 36w5d, who presents with decreased FM since last night;  no LOF, vb; Pt with GHTN - no meds.    Her prenatal care is complicated by  Hypertension gestational hypertension.  Her previous obstetric/gynecological history is noted for is non-contributory.    The following portions of the patients history were reviewed and updated as appropriate:   current medications, allergies, past medical history, past surgical history, past family history, past social history and current problem list.     Prenatal Information:  Prenatal Results       Initial Prenatal Labs       Test Value Reference Range Date Time    Hemoglobin  13.5 g/dL 12.0 - 15.9 24 0926    Hematocrit  41.0 % 34.0 - 46.6 24 0926    Platelets  300 10*3/mm3 140 - 450 24 0926    Rubella IgG  1.21 index Immune >0.99 24 0926    Hepatitis B SAg  Non-Reactive  Non-Reactive 24 0926    Hepatitis C Ab  Non-Reactive  Non-Reactive 24 0926    RPR  Non Reactive  Non Reactive 24 1607       Non-Reactive  Non-Reactive 24 0926    T. Pallidum Ab         ABO  AB   24 0926    Rh  Positive   24 0926    Antibody Screen  Negative   24 0926    HIV  Non-Reactive  Non-Reactive 24 0926    Urine Culture  No growth   24 1137    Gonorrhea  Not Detected  Not Detected  24 1137    Chlamydia  Not Detected  Not Detected  24 1137    TSH        HgB A1c         Varicella IgG        Hemoglobinopathy Fractionation        Hemoglobinopathy (genetic testing)        Cystic fibrosis         Spinal muscular atrophy        Fragile X                  Fetal testing        Test Value Reference Range Date Time    NIPT        MSAFP        AFP-4                  2nd and 3rd Trimester       Test Value Reference  Range Date Time    Hemoglobin (repeated)  12.0 g/dL 12.0 - 15.9 03/03/25 2354       12.3 g/dL 12.0 - 15.9 02/24/25 1450       13.8 g/dL 12.0 - 15.9 02/09/25 0834       12.4 g/dL 12.0 - 15.9 12/06/24 1607    Hematocrit (repeated)  36.6 % 34.0 - 46.6 03/03/25 2354       37.3 % 34.0 - 46.6 02/24/25 1450       43.0 % 34.0 - 46.6 02/09/25 0834       37.3 % 34.0 - 46.6 12/06/24 1607    Platelets   251 10*3/mm3 140 - 450 03/03/25 2354       257 10*3/mm3 140 - 450 02/24/25 1450       266 10*3/mm3 140 - 450 02/09/25 0834       313 10*3/mm3 140 - 450 12/06/24 1607       300 10*3/mm3 140 - 450 09/17/24 0926    1 hour GTT   111 mg/dL 65 - 139 12/06/24 1607    Antibody Screen (repeated)        3rd TM syphilis scrn (repeated)  RPR         3rd TM syphilis scrn (repeated) TP-Ab        3rd TM syphilis screen TB-Ab (FTA)        Syphilis cascade test TP-Ab (EIA)        Syphilis cascade TPPA        GTT Fasting        GTT 1 Hr        GTT 2 Hr        GTT 3 Hr        Group B Strep  Positive  Negative 02/27/25 1455              Other testing        Test Value Reference Range Date Time    Parvo IgG         CMV IgG                   Drug Screening       Test Value Reference Range Date Time    Amphetamine Screen        Barbiturate Screen  Negative  Negative 09/09/24 1137    Benzodiazepine Screen  Negative  Negative 09/09/24 1137    Methadone Screen  Negative  Negative 09/09/24 1137    Phencyclidine Screen        Opiates Screen  Negative  Negative 09/09/24 1137    THC Screen  Negative  Negative 09/09/24 1137    Cocaine Screen  Negative  Negative 09/09/24 1137    Propoxyphene Screen        Buprenorphine Screen        Methamphetamine Screen        Oxycodone Screen  Negative  Negative 09/09/24 1137    Tricyclic Antidepressants Screen                  Legend    ^: Historical                          External Prenatal Results       Pregnancy Outside Results - Transcribed From Office Records - See Scanned Records For Details       Test Value Date  Time    ABO  AB  09/17/24 0926    Rh  Positive  09/17/24 0926    Antibody Screen  Negative  09/17/24 0926    Varicella IgG       Rubella  1.21 index 09/17/24 0926    Hgb  12.0 g/dL 03/03/25 2354       12.3 g/dL 02/24/25 1450       13.8 g/dL 02/09/25 0834       12.4 g/dL 12/06/24 1607       13.5 g/dL 09/17/24 0926    Hct  36.6 % 03/03/25 2354       37.3 % 02/24/25 1450       43.0 % 02/09/25 0834       37.3 % 12/06/24 1607       41.0 % 09/17/24 0926    HgB A1c        1h GTT  111 mg/dL 12/06/24 1607    3h GTT Fasting       3h GTT 1 hour       3h GTT 2 hour       3h GTT 3 hour        Gonorrhea (discrete)  Not Detected  09/09/24 1137    Chlamydia (discrete)  Not Detected  09/09/24 1137    RPR  Non Reactive  12/06/24 1607       Non-Reactive  09/17/24 0926    Syphils cascade: TP-Ab (FTA)       TP-Ab       TP-Ab (EIA)       TPPA       HBsAg  Non-Reactive  09/17/24 0926    Herpes Simplex Virus PCR       Herpes Simplex VIrus Culture       HIV  Non-Reactive  09/17/24 0926    Hep C RNA Quant PCR       Hep C Antibody  Non-Reactive  09/17/24 0926    AFP       NIPT       Cystic Fibrosis (Samm)       Cystic Fibroisis        Spinal Muscular atrophy       Fragile X       Group B Strep  Positive  02/27/25 1455    GBS Susceptibility to Clindamycin       GBS Susceptibility to Erythromycin       Fetal Fibronectin       Genetic Testing, Maternal Blood                 Drug Screening       Test Value Date Time    Urine Drug Screen       Amphetamine Screen       Barbiturate Screen  Negative  09/09/24 1137    Benzodiazepine Screen  Negative  09/09/24 1137    Methadone Screen  Negative  09/09/24 1137    Phencyclidine Screen       Opiates Screen  Negative  09/09/24 1137    THC Screen  Negative  09/09/24 1137    Cocaine Screen       Propoxyphene Screen       Buprenorphine Screen       Methamphetamine Screen       Oxycodone Screen  Negative  09/09/24 1137    Tricyclic Antidepressants Screen                 Legend    ^: Historical                              Problem List:     Patient Active Problem List   Diagnosis    Obesity in pregnancy, antepartum    Nausea and vomiting    Current every day vaping    Anxiety and depression    Supervision of other normal pregnancy, antepartum    Gestational hypertension, third trimester    Gastroenteritis        Past OB History:     OB History    Para Term  AB Living   2 0 0 0 1 0   SAB IAB Ectopic Molar Multiple Live Births   1 0 0 0 0 0      # Outcome Date GA Lbr Juarez/2nd Weight Sex Type Anes PTL Lv   2 Current            1 SAB 24 5w0d              Past Medical History: Past Medical History:   Diagnosis Date    Anxiety and depression 2024    Chlamydia     Chronic fatigue syndrome 2023    Chronic idiopathic constipation 2023    SAB (spontaneous ) 2024    Completed  2024 8weeks by LMP, SAB, no D+C        Past Surgical History Past Surgical History:   Procedure Laterality Date    ANKLE SURGERY Right       Family History: Family History   Problem Relation Age of Onset    Hypertension Father     Breast cancer Neg Hx     Ovarian cancer Neg Hx     Uterine cancer Neg Hx     Colon cancer Neg Hx     Diabetes Neg Hx     Deep vein thrombosis Neg Hx     Pulmonary embolism Neg Hx       Social History:  reports that she has never smoked. She has never used smokeless tobacco.   reports no history of alcohol use.   reports no history of drug use.        General ROS: Pertinent items are noted in HPI        Home Medications:  Hydrocortisone Ace-Pramoxine, Prenatal Multivitamin + DHA, nicotine, and ondansetron ODT    Allergies:  Allergies   Allergen Reactions    Coconut Unknown - Low Severity       Objective       Vital Signs Range for the last 24 hours  Temperature:     Temp Source:     BP: BP: (138-156)/(72-84) 153/82   Pulse: Heart Rate:  [57-66] 66   Respirations: Resp:  [14] 14   SPO2: SpO2:  [98 %-99 %] 98 %     Physical Examination:   General appearance - alert, well  appearing, and in no distress  Mental status - alert, oriented to person, place, and time  Abdomen - soft, nontender, nondistended,   Pelvic - normal external genitalia, vulva, vagina, cervix, and uterus   Back exam - full range of motion, no tenderness or pain on motion  Neurological - alert, oriented, normal speech  Extremities - peripheral pulses normal  Skin - normal coloration and turgor, no suspicious skin lesions noted    Presentation: unknown   Cervix:     Dilation:     Effacement:     Station:         Fetal Heart Rate Assessment :  130s, R, GLTV, cat 1  Method:     Beats/min:     Baseline:     Variability:     Accels:     Decels:     Tracing Category:       Uterine Assessment :  occ  Method:     Frequency (min):     Ctx Count in 10 min:     Duration:     Intensity:     [place] Units:       Lab Results (last 24 hours)       Procedure Component Value Units Date/Time    CBC & Differential [[id number]]  (Abnormal) Collected: 03/03/25 2354    Specimen: Blood Updated: 03/04/25 0012    Narrative:      The following orders were created for panel order CBC & Differential.  Procedure                               Abnormality         Status                     ---------                               -----------         ------                     CBC Auto Differential[[id number]]        Abnormal            Final result                 Please view results for these tests on the individual orders.    Comprehensive Metabolic Panel [[id number]]  (Abnormal) Collected: 03/03/25 2354    Specimen: Blood Updated: 03/04/25 0032     Glucose 120 mg/dL      BUN 9 mg/dL      Creatinine 0.79 mg/dL      Sodium 137 mmol/L      Potassium 3.8 mmol/L      Chloride 109 mmol/L      CO2 18.8 mmol/L      Calcium 9.2 mg/dL      Total Protein 5.8 g/dL      Albumin 2.9 g/dL      ALT (SGPT) 14 U/L      AST (SGOT) 16 U/L      Alkaline Phosphatase 138 U/L      Total Bilirubin <0.2 mg/dL      Globulin 2.9 gm/dL      A/G Ratio 1.0 g/dL       BUN/Creatinine Ratio 11.4     Anion Gap 9.2 mmol/L      eGFR 107.9 mL/min/1.73     Narrative:      GFR Categories in Chronic Kidney Disease (CKD)      GFR Category          GFR (mL/min/1.73)    Interpretation  G1                     90 or greater         Normal or high (1)  G2                      60-89                Mild decrease (1)  G3a                   45-59                Mild to moderate decrease  G3b                   30-44                Moderate to severe decrease  G4                    15-29                Severe decrease  G5                    14 or less           Kidney failure          (1)In the absence of evidence of kidney disease, neither GFR category G1 or G2 fulfill the criteria for CKD.    eGFR calculation 2021 CKD-EPI creatinine equation, which does not include race as a factor    CBC Auto Differential [[id number]]  (Abnormal) Collected: 03/03/25 2354    Specimen: Blood Updated: 03/04/25 0012     WBC 14.75 10*3/mm3      RBC 4.22 10*6/mm3      Hemoglobin 12.0 g/dL      Hematocrit 36.6 %      MCV 86.7 fL      MCH 28.4 pg      MCHC 32.8 g/dL      RDW 13.2 %      RDW-SD 40.7 fl      MPV 10.1 fL      Platelets 251 10*3/mm3      Neutrophil % 78.6 %      Lymphocyte % 13.0 %      Monocyte % 7.5 %      Eosinophil % 0.3 %      Basophil % 0.2 %      Immature Grans % 0.4 %      Neutrophils, Absolute 11.59 10*3/mm3      Lymphocytes, Absolute 1.92 10*3/mm3      Monocytes, Absolute 1.11 10*3/mm3      Eosinophils, Absolute 0.04 10*3/mm3      Basophils, Absolute 0.03 10*3/mm3      Immature Grans, Absolute 0.06 10*3/mm3      nRBC 0.0 /100 WBC     Protein / Creatinine Ratio, Urine - Urine, Clean Catch [[id number]] Collected: 03/03/25 2354    Specimen: Urine, Clean Catch Updated: 03/04/25 0024     Creatinine, Urine 107.9 mg/dL      Total Protein, Urine 22.4 mg/dL      Protein/Creatinine Ratio, Urine 0.21    POC Urinalysis Dipstick [[id number]]  (Abnormal) Collected: 03/04/25 0017    Specimen: Urine Updated: 03/04/25  0019     Color Yellow     Clarity, UA Clear     Glucose, UA Negative mg/dL      Bilirubin Negative     Ketones, UA Negative     Specific Gravity  1.025     Blood, UA Trace     pH, Urine 6.0     Protein, POC 30 mg/dL mg/dL      Urobilinogen, UA 0.2 E.U./dL     Leukocytes Trace     Nitrite, UA Positive             GBS is positive     Assessment & Plan     Decreased FM  GHTN    Assessment:  1.  Intrauterine pregnancy at 36w5d gestation with reactive fetal status.    2.  decreased fetal movement  3.  Obstetrical history significant for is remarkable for GHTN   4.  GBS status:   Group B Strep, DNA   Date Value Ref Range Status   02/27/2025 Positive (A) Negative Final       Plan:  1. Discharge to home.  As baby looks wonderful and no s/s preE;  precautions given;  pt can skip NST today as has been on the monitor over 1 hr and keep appt and NST Thursday  2.  Plan of care has been reviewed with patient and patient agrees.   3.  Risks, benefits of treatment plan have been discussed.  4.  All questions have been answered.        Electronically signed by Tanna Butts MD, 03/04/25, 12:52 AM EST.

## 2025-03-04 NOTE — SIGNIFICANT NOTE
Patient discharged with information about fetal kick counts, signs of labor, and information about GHTN, pre-eclampsia.     Patient left floor ambulatory, no visible signs of distress, accompanied by spouse.

## 2025-03-04 NOTE — NON STRESS TEST
Obstetrical Non-stress Test Interpretation     Name:  Jennifer Francis  MRN: [id number]    24 y.o. female  at 36w5d    Indication: triage for decreased fetal movement and hypertension       Fetal Assessment  Fetal Movement: active  Fetal HR Assessment Method: external  Fetal HR (beats/min): 130  Fetal HR Baseline: normal range  Fetal HR Variability: moderate (amplitude range 6 to 25 bpm)  Fetal HR Accelerations: lasting at least 15 seconds, greater than/equal to 15 bpm  Fetal HR Decelerations: absent  Sinusoidal Pattern Present: absent    /82 (BP Location: Right arm, Patient Position: Sitting)   Pulse 66   Resp 14   LMP 2024   SpO2 98%     Reason for test: OB Triage, Decreased fetal movement, Hypertension  Date of Test: 3/4/2025  Time frame of test: 8755-0803  RN NST Interpretation: Reactive      Aure Vasquez RN  3/4/2025  01:08 EST

## 2025-03-04 NOTE — NURSING NOTE
Patient presenting to triage with complaints of decreased fetal movement. Patient claims she has not felt baby move for 2 hours before arriving to triage.     NST initiated, kick counts initiated. Vitals signs taken and revealed BP of 149/77. Serial vitals initiated.      CMP, CBC, and P/C ratio collected. Patient also revealed that she experienced lightheaded episode with visual disturbances at home earlier in the day.     Dr. Butts notified of patient arrival. Updated on vitals, labs, NST, and patient complaints.     Cleared for D/C per MD. Discharge education provided.

## 2025-03-05 NOTE — PROGRESS NOTES
Routine Prenatal Visit     Subjective  Jennifer Penny is a 24 y.o.  at 37w0d here for her routine OB visit.   She is taking her prenatal vitamins.Reports no loss of fluid or vaginal bleeding. Patient doing ok. BP today elevated x2. States she hasn't been checking at home because she knows they are high. LE edema worsening. Yoel MARTELL, visoin changes or RUQ pain.     Pregnancy complicated by:     Patient Active Problem List   Diagnosis    Obesity in pregnancy, antepartum    Nausea and vomiting    Current every day vaping    Anxiety and depression    Supervision of other normal pregnancy, antepartum    Gestational hypertension, third trimester    Gastroenteritis         OB History    Para Term  AB Living   2       1     SAB IAB Ectopic Molar Multiple Live Births   1                # Outcome Date GA Lbr Juarez/2nd Weight Sex Type Anes PTL Lv   2 Current            1 SAB 24 5w0d                  ROS:  General ROS: negative for - chills or fatigue  Genito-Urinary ROS: negative for  change in urinary stream, vaginal discharge     Objective  Physical Exam:   Vitals:    25 1117   BP: 152/89       Uterine Size: size equals dates  FHT: 110-160 BPM     SVE 50/-3    Assessment/Plan:   Diagnoses and all orders for this visit:    1. Supervision of other normal pregnancy, antepartum (Primary)  -     POC Urinalysis Dipstick    2. Gestational hypertension, third trimester    3. Obesity in pregnancy, antepartum    4. Anxiety and depression            Counseling:   OB precautions, leaking, VB, gerardo mckinnon vs PTL/Labor  [facility]  HTN precautions reviewed: HA, vision change, RUQ/epigastric pain, edema  SENT TO L+D for evaluation of BP, NST and Labs.  L+D charge nurse and covering OB/GYN hospitalist notified.  DW pt, questions answered.  Round Ligament Pain:  The uterus has several ligaments which provide support and keep the uterus in place. As the  uterus grows these ligaments are pulled and stretched which  often causes sharp stabbing like pain in the inguinal area.   You may find a pregnancy support band helpful. Changing positions may also help. Yoga is a great way to cope with round ligament and low back pain in pregnancy.    Massage may also help with low back pain   Things to Consider at this Point in your Pregnancy:  Some women experience swelling in their feet during pregnancy. Compression stockings may help  Drink plenty of water and stay active   Make sure you are eating frequent small meals, nuts are a wonderful snack to keep with you            Return in about 1 week (around 3/13/2025) for Routine OB visit.      We have gone over prenatal care to include the timing and content of visits. I informed her how to contact the office and/or on call person in the event of any problems and encouraged her to do so when she feels it is necessary.  We then spent time answering her questions which she indicated were answered to her satisfaction.    Carol Giron,   3/6/2025 11:29 EST

## 2025-03-06 ENCOUNTER — HOSPITAL ENCOUNTER (OUTPATIENT)
Dept: LABOR AND DELIVERY | Facility: HOSPITAL | Age: 25
Discharge: HOME | End: 2025-03-06
Payer: MEDICAID

## 2025-03-06 ENCOUNTER — PREP FOR SURGERY (OUTPATIENT)
Dept: OTHER | Facility: HOSPITAL | Age: 25
End: 2025-03-06
Payer: MEDICAID

## 2025-03-06 ENCOUNTER — ROUTINE PRENATAL (OUTPATIENT)
Dept: OBSTETRICS AND GYNECOLOGY | Facility: CLINIC | Age: 25
End: 2025-03-06
Payer: MEDICAID

## 2025-03-06 ENCOUNTER — HOSPITAL ENCOUNTER (OUTPATIENT)
Facility: HOSPITAL | Age: 25
Discharge: HOME | End: 2025-03-06
Attending: OBSTETRICS & GYNECOLOGY | Admitting: STUDENT IN AN ORGANIZED HEALTH CARE EDUCATION/TRAINING PROGRAM
Payer: MEDICAID

## 2025-03-06 VITALS — HEART RATE: 64 BPM | DIASTOLIC BLOOD PRESSURE: 83 MMHG | SYSTOLIC BLOOD PRESSURE: 152 MMHG

## 2025-03-06 VITALS — BODY MASS INDEX: 48.47 KG/M2 | WEIGHT: 265 LBS | SYSTOLIC BLOOD PRESSURE: 152 MMHG | DIASTOLIC BLOOD PRESSURE: 89 MMHG

## 2025-03-06 DIAGNOSIS — Z34.80 SUPERVISION OF OTHER NORMAL PREGNANCY, ANTEPARTUM: Primary | ICD-10-CM

## 2025-03-06 DIAGNOSIS — F32.A ANXIETY AND DEPRESSION: ICD-10-CM

## 2025-03-06 DIAGNOSIS — O99.210 OBESITY IN PREGNANCY, ANTEPARTUM: ICD-10-CM

## 2025-03-06 DIAGNOSIS — F41.9 ANXIETY AND DEPRESSION: ICD-10-CM

## 2025-03-06 DIAGNOSIS — O13.3 GESTATIONAL HYPERTENSION, THIRD TRIMESTER: ICD-10-CM

## 2025-03-06 LAB
ALBUMIN SERPL-MCNC: 3.1 G/DL (ref 3.5–5.2)
ALBUMIN/GLOB SERPL: 1.1 G/DL
ALP SERPL-CCNC: 144 U/L (ref 39–117)
ALT SERPL W P-5'-P-CCNC: 13 U/L (ref 1–33)
ANION GAP SERPL CALCULATED.3IONS-SCNC: 11.2 MMOL/L (ref 5–15)
AST SERPL-CCNC: 16 U/L (ref 1–32)
BILIRUB SERPL-MCNC: 0.2 MG/DL (ref 0–1.2)
BUN SERPL-MCNC: 9 MG/DL (ref 6–20)
BUN/CREAT SERPL: 12.7 (ref 7–25)
CALCIUM SPEC-SCNC: 8.8 MG/DL (ref 8.6–10.5)
CHLORIDE SERPL-SCNC: 106 MMOL/L (ref 98–107)
CO2 SERPL-SCNC: 19.8 MMOL/L (ref 22–29)
CREAT SERPL-MCNC: 0.71 MG/DL (ref 0.57–1)
CREAT UR-MCNC: 120.2 MG/DL
DEPRECATED RDW RBC AUTO: 40.9 FL (ref 37–54)
EGFRCR SERPLBLD CKD-EPI 2021: 122.7 ML/MIN/1.73
ERYTHROCYTE [DISTWIDTH] IN BLOOD BY AUTOMATED COUNT: 13.3 % (ref 12.3–15.4)
GLOBULIN UR ELPH-MCNC: 2.9 GM/DL
GLUCOSE SERPL-MCNC: 103 MG/DL (ref 65–99)
GLUCOSE UR STRIP-MCNC: NEGATIVE MG/DL
HCT VFR BLD AUTO: 37.7 % (ref 34–46.6)
HGB BLD-MCNC: 12.6 G/DL (ref 12–15.9)
MCH RBC QN AUTO: 28.6 PG (ref 26.6–33)
MCHC RBC AUTO-ENTMCNC: 33.4 G/DL (ref 31.5–35.7)
MCV RBC AUTO: 85.5 FL (ref 79–97)
PLATELET # BLD AUTO: 259 10*3/MM3 (ref 140–450)
PMV BLD AUTO: 10.2 FL (ref 6–12)
POTASSIUM SERPL-SCNC: 4.1 MMOL/L (ref 3.5–5.2)
PROT ?TM UR-MCNC: 28.5 MG/DL
PROT SERPL-MCNC: 6 G/DL (ref 6–8.5)
PROT UR STRIP-MCNC: NEGATIVE MG/DL
PROT/CREAT UR: 0.24 MG/G{CREAT}
RBC # BLD AUTO: 4.41 10*6/MM3 (ref 3.77–5.28)
SODIUM SERPL-SCNC: 137 MMOL/L (ref 136–145)
WBC NRBC COR # BLD AUTO: 13.6 10*3/MM3 (ref 3.4–10.8)

## 2025-03-06 PROCEDURE — 80053 COMPREHEN METABOLIC PANEL: CPT | Performed by: STUDENT IN AN ORGANIZED HEALTH CARE EDUCATION/TRAINING PROGRAM

## 2025-03-06 PROCEDURE — 82570 ASSAY OF URINE CREATININE: CPT | Performed by: STUDENT IN AN ORGANIZED HEALTH CARE EDUCATION/TRAINING PROGRAM

## 2025-03-06 PROCEDURE — 85027 COMPLETE CBC AUTOMATED: CPT | Performed by: STUDENT IN AN ORGANIZED HEALTH CARE EDUCATION/TRAINING PROGRAM

## 2025-03-06 PROCEDURE — 59025 FETAL NON-STRESS TEST: CPT

## 2025-03-06 PROCEDURE — 84156 ASSAY OF PROTEIN URINE: CPT | Performed by: STUDENT IN AN ORGANIZED HEALTH CARE EDUCATION/TRAINING PROGRAM

## 2025-03-06 RX ORDER — ONDANSETRON 2 MG/ML
4 INJECTION INTRAMUSCULAR; INTRAVENOUS EVERY 6 HOURS PRN
Status: CANCELLED | OUTPATIENT
Start: 2025-03-06

## 2025-03-06 RX ORDER — SODIUM CHLORIDE 9 MG/ML
40 INJECTION, SOLUTION INTRAVENOUS AS NEEDED
Status: CANCELLED | OUTPATIENT
Start: 2025-03-06

## 2025-03-06 RX ORDER — SODIUM CHLORIDE, SODIUM LACTATE, POTASSIUM CHLORIDE, CALCIUM CHLORIDE 600; 310; 30; 20 MG/100ML; MG/100ML; MG/100ML; MG/100ML
125 INJECTION, SOLUTION INTRAVENOUS CONTINUOUS
Status: CANCELLED | OUTPATIENT
Start: 2025-03-06 | End: 2025-03-07

## 2025-03-06 RX ORDER — ACETAMINOPHEN 325 MG/1
650 TABLET ORAL ONCE AS NEEDED
Status: CANCELLED | OUTPATIENT
Start: 2025-03-06

## 2025-03-06 RX ORDER — MISOPROSTOL 200 UG/1
800 TABLET ORAL AS NEEDED
Status: CANCELLED | OUTPATIENT
Start: 2025-03-06

## 2025-03-06 RX ORDER — OXYTOCIN/0.9 % SODIUM CHLORIDE 30/500 ML
250 PLASTIC BAG, INJECTION (ML) INTRAVENOUS CONTINUOUS
Status: CANCELLED | OUTPATIENT
Start: 2025-03-06 | End: 2025-03-06

## 2025-03-06 RX ORDER — ONDANSETRON 4 MG/1
4 TABLET, ORALLY DISINTEGRATING ORAL EVERY 6 HOURS PRN
Status: CANCELLED | OUTPATIENT
Start: 2025-03-06

## 2025-03-06 RX ORDER — FAMOTIDINE 10 MG/ML
20 INJECTION, SOLUTION INTRAVENOUS 2 TIMES DAILY PRN
Status: CANCELLED | OUTPATIENT
Start: 2025-03-06

## 2025-03-06 RX ORDER — MISOPROSTOL 100 MCG
50 TABLET ORAL
Status: CANCELLED | OUTPATIENT
Start: 2025-03-06 | End: 2025-03-07

## 2025-03-06 RX ORDER — IBUPROFEN 800 MG/1
800 TABLET, FILM COATED ORAL ONCE AS NEEDED
Status: CANCELLED | OUTPATIENT
Start: 2025-03-06

## 2025-03-06 RX ORDER — FAMOTIDINE 20 MG/1
20 TABLET, FILM COATED ORAL 2 TIMES DAILY PRN
Status: CANCELLED | OUTPATIENT
Start: 2025-03-06

## 2025-03-06 RX ORDER — PROMETHAZINE HYDROCHLORIDE 25 MG/1
25 TABLET ORAL EVERY 6 HOURS PRN
Status: CANCELLED | OUTPATIENT
Start: 2025-03-06

## 2025-03-06 RX ORDER — SODIUM CHLORIDE 0.9 % (FLUSH) 0.9 %
10 SYRINGE (ML) INJECTION AS NEEDED
Status: CANCELLED | OUTPATIENT
Start: 2025-03-06

## 2025-03-06 RX ORDER — CITRIC ACID/SODIUM CITRATE 334-500MG
30 SOLUTION, ORAL ORAL ONCE AS NEEDED
Status: CANCELLED | OUTPATIENT
Start: 2025-03-06

## 2025-03-06 RX ORDER — TERBUTALINE SULFATE 1 MG/ML
0.25 INJECTION SUBCUTANEOUS AS NEEDED
Status: CANCELLED | OUTPATIENT
Start: 2025-03-06

## 2025-03-06 RX ORDER — FAMOTIDINE 20 MG/1
20 TABLET, FILM COATED ORAL ONCE AS NEEDED
Status: CANCELLED | OUTPATIENT
Start: 2025-03-06

## 2025-03-06 RX ORDER — LIDOCAINE HYDROCHLORIDE 10 MG/ML
0.5 INJECTION, SOLUTION EPIDURAL; INFILTRATION; INTRACAUDAL; PERINEURAL ONCE AS NEEDED
Status: CANCELLED | OUTPATIENT
Start: 2025-03-06

## 2025-03-06 RX ORDER — PROMETHAZINE HYDROCHLORIDE 12.5 MG/1
12.5 TABLET ORAL EVERY 6 HOURS PRN
Status: CANCELLED | OUTPATIENT
Start: 2025-03-06

## 2025-03-06 RX ORDER — SODIUM CHLORIDE 0.9 % (FLUSH) 0.9 %
10 SYRINGE (ML) INJECTION EVERY 12 HOURS SCHEDULED
Status: CANCELLED | OUTPATIENT
Start: 2025-03-06

## 2025-03-06 RX ORDER — METHYLERGONOVINE MALEATE 0.2 MG/ML
200 INJECTION INTRAVENOUS ONCE AS NEEDED
Status: CANCELLED | OUTPATIENT
Start: 2025-03-06

## 2025-03-06 RX ORDER — MAGNESIUM CARB/ALUMINUM HYDROX 105-160MG
30 TABLET,CHEWABLE ORAL ONCE
Status: CANCELLED | OUTPATIENT
Start: 2025-03-06 | End: 2025-03-06

## 2025-03-06 RX ORDER — ACETAMINOPHEN 325 MG/1
650 TABLET ORAL EVERY 4 HOURS PRN
Status: CANCELLED | OUTPATIENT
Start: 2025-03-06

## 2025-03-06 RX ORDER — HYDROCODONE BITARTRATE AND ACETAMINOPHEN 5; 325 MG/1; MG/1
1 TABLET ORAL EVERY 4 HOURS PRN
Status: CANCELLED | OUTPATIENT
Start: 2025-03-06 | End: 2025-03-13

## 2025-03-06 RX ORDER — FAMOTIDINE 10 MG/ML
20 INJECTION, SOLUTION INTRAVENOUS ONCE AS NEEDED
Status: CANCELLED | OUTPATIENT
Start: 2025-03-06

## 2025-03-06 RX ORDER — METOCLOPRAMIDE HYDROCHLORIDE 5 MG/ML
10 INJECTION INTRAMUSCULAR; INTRAVENOUS ONCE AS NEEDED
Status: CANCELLED | OUTPATIENT
Start: 2025-03-06

## 2025-03-06 RX ORDER — OXYTOCIN/0.9 % SODIUM CHLORIDE 30/500 ML
999 PLASTIC BAG, INJECTION (ML) INTRAVENOUS ONCE
Status: CANCELLED | OUTPATIENT
Start: 2025-03-06 | End: 2025-03-06

## 2025-03-06 RX ORDER — HYDROCODONE BITARTRATE AND ACETAMINOPHEN 10; 325 MG/1; MG/1
1 TABLET ORAL EVERY 4 HOURS PRN
Status: CANCELLED | OUTPATIENT
Start: 2025-03-06 | End: 2025-03-13

## 2025-03-06 NOTE — OBED NOTES
RACHAEL Davies  Obstetric History and Physical    Chief Complaint   Patient presents with    Elevated Blood Pressure       Subjective     Patient is a 24 y.o. female  currently at 37w0d, who presents from the office for PIH workup.  Patient already has diagnosis of gestational hypertension.  She denies any headache, visual disturbance, right upper quadrant pain.  Her blood pressure in the office was in the 150s over 80s    PNC provided by:  [facility] [place]. Her prenatal care is umbilicated by gestational hypertension.  Her previous obstetric/gynecological history is noted for is non-contributory.    The following portions of the patients history were reviewed and updated as appropriate: current medications, allergies, past medical history, past surgical history, past family history, past social history, and problem list .       Prenatal Information:  Prenatal Results       Initial Prenatal Labs       Test Value Reference Range Date Time    Hemoglobin  13.5 g/dL 12.0 - 15.9 24 0926    Hematocrit  41.0 % 34.0 - 46.6 24 09    Platelets  300 10*3/mm3 140 - 450 24 0926    Rubella IgG  1.21 index Immune >0.99 24 0926    Hepatitis B SAg  Non-Reactive  Non-Reactive 24 0926    Hepatitis C Ab  Non-Reactive  Non-Reactive 24 0926    RPR  Non Reactive  Non Reactive 24 1607       Non-Reactive  Non-Reactive 24 0926    T. Pallidum Ab         ABO  AB   24 09    Rh  Positive   24 0926    Antibody Screen  Negative   24 0926    HIV  Non-Reactive  Non-Reactive 24 0926    Urine Culture  No growth   24 1137    Gonorrhea  Not Detected  Not Detected  24 1137    Chlamydia  Not Detected  Not Detected  24 1137    TSH        HgB A1c         Varicella IgG        Hemoglobinopathy Fractionation        Hemoglobinopathy (genetic testing)        Cystic fibrosis         Spinal muscular atrophy        Fragile X                  Fetal testing        Test Value Reference  Range Date Time    NIPT        MSAFP        AFP-4                  2nd and 3rd Trimester       Test Value Reference Range Date Time    Hemoglobin (repeated)  12.6 g/dL 12.0 - 15.9 03/06/25 1209       12.0 g/dL 12.0 - 15.9 03/03/25 2354       12.3 g/dL 12.0 - 15.9 02/24/25 1450       13.8 g/dL 12.0 - 15.9 02/09/25 0834       12.4 g/dL 12.0 - 15.9 12/06/24 1607    Hematocrit (repeated)  37.7 % 34.0 - 46.6 03/06/25 1209       36.6 % 34.0 - 46.6 03/03/25 2354       37.3 % 34.0 - 46.6 02/24/25 1450       43.0 % 34.0 - 46.6 02/09/25 0834       37.3 % 34.0 - 46.6 12/06/24 1607    Platelets   259 10*3/mm3 140 - 450 03/06/25 1209       251 10*3/mm3 140 - 450 03/03/25 2354       257 10*3/mm3 140 - 450 02/24/25 1450       266 10*3/mm3 140 - 450 02/09/25 0834       313 10*3/mm3 140 - 450 12/06/24 1607       300 10*3/mm3 140 - 450 09/17/24 0926    1 hour GTT   111 mg/dL 65 - 139 12/06/24 1607    Antibody Screen (repeated)        3rd TM syphilis scrn (repeated)  RPR         3rd TM syphilis scrn (repeated) TP-Ab        3rd TM syphilis screen TB-Ab (FTA)        Syphilis cascade test TP-Ab (EIA)        Syphilis cascade TPPA        GTT Fasting        GTT 1 Hr        GTT 2 Hr        GTT 3 Hr        Group B Strep  Positive  Negative 02/27/25 1455              Other testing        Test Value Reference Range Date Time    Parvo IgG         CMV IgG                   Drug Screening       Test Value Reference Range Date Time    Amphetamine Screen        Barbiturate Screen  Negative  Negative 09/09/24 1137    Benzodiazepine Screen  Negative  Negative 09/09/24 1137    Methadone Screen  Negative  Negative 09/09/24 1137    Phencyclidine Screen        Opiates Screen  Negative  Negative 09/09/24 1137    THC Screen  Negative  Negative 09/09/24 1137    Cocaine Screen  Negative  Negative 09/09/24 1137    Propoxyphene Screen        Buprenorphine Screen        Methamphetamine Screen        Oxycodone Screen  Negative  Negative 09/09/24 1137    Tricyclic  Antidepressants Screen                  Legend    ^: Historical                          External Prenatal Results       Pregnancy Outside Results - Transcribed From Office Records - See Scanned Records For Details       Test Value Date Time    ABO  AB  09/17/24 0926    Rh  Positive  09/17/24 0926    Antibody Screen  Negative  09/17/24 0926    Varicella IgG       Rubella  1.21 index 09/17/24 0926    Hgb  12.6 g/dL 03/06/25 1209       12.0 g/dL 03/03/25 2354       12.3 g/dL 02/24/25 1450       13.8 g/dL 02/09/25 0834       12.4 g/dL 12/06/24 1607       13.5 g/dL 09/17/24 0926    Hct  37.7 % 03/06/25 1209       36.6 % 03/03/25 2354       37.3 % 02/24/25 1450       43.0 % 02/09/25 0834       37.3 % 12/06/24 1607       41.0 % 09/17/24 0926    HgB A1c        1h GTT  111 mg/dL 12/06/24 1607    3h GTT Fasting       3h GTT 1 hour       3h GTT 2 hour       3h GTT 3 hour        Gonorrhea (discrete)  Not Detected  09/09/24 1137    Chlamydia (discrete)  Not Detected  09/09/24 1137    RPR  Non Reactive  12/06/24 1607       Non-Reactive  09/17/24 0926    Syphils cascade: TP-Ab (FTA)       TP-Ab       TP-Ab (EIA)       TPPA       HBsAg  Non-Reactive  09/17/24 0926    Herpes Simplex Virus PCR       Herpes Simplex VIrus Culture       HIV  Non-Reactive  09/17/24 0926    Hep C RNA Quant PCR       Hep C Antibody  Non-Reactive  09/17/24 0926    AFP       NIPT       Cystic Fibrosis (Samm)       Cystic Fibroisis        Spinal Muscular atrophy       Fragile X       Group B Strep  Positive  02/27/25 1455    GBS Susceptibility to Clindamycin       GBS Susceptibility to Erythromycin       Fetal Fibronectin       Genetic Testing, Maternal Blood                 Drug Screening       Test Value Date Time    Urine Drug Screen       Amphetamine Screen       Barbiturate Screen  Negative  09/09/24 1137    Benzodiazepine Screen  Negative  09/09/24 1137    Methadone Screen  Negative  09/09/24 1137    Phencyclidine Screen       Opiates Screen   Negative  24 1137    THC Screen  Negative  24 1137    Cocaine Screen       Propoxyphene Screen       Buprenorphine Screen       Methamphetamine Screen       Oxycodone Screen  Negative  24 1137    Tricyclic Antidepressants Screen                 Legend    ^: Historical                             Past OB History:     OB History    Para Term  AB Living   2 0 0 0 1 0   SAB IAB Ectopic Molar Multiple Live Births   1 0 0 0 0 0      # Outcome Date GA Lbr Juarez/2nd Weight Sex Type Anes PTL Lv   2 Current            1 SAB 24 5w0d              Past Medical History: Past Medical History:   Diagnosis Date    Anxiety and depression 2024    Chlamydia     Chronic fatigue syndrome 2023    Chronic idiopathic constipation 2023    SAB (spontaneous ) 2024    Completed  2024 8weeks by LMP, SAB, no D+C        Past Surgical History Past Surgical History:   Procedure Laterality Date    ANKLE SURGERY Right       Family History: Family History   Problem Relation Age of Onset    Hypertension Father     Breast cancer Neg Hx     Ovarian cancer Neg Hx     Uterine cancer Neg Hx     Colon cancer Neg Hx     Diabetes Neg Hx     Deep vein thrombosis Neg Hx     Pulmonary embolism Neg Hx       Social History:  reports that she has never smoked. She has never used smokeless tobacco.   reports no history of alcohol use.   reports no history of drug use.        General ROS: Pertinent items are noted in HPI    Objective       Vital Signs Range for the last 24 hours  Temperature:     Temp Source:     BP: BP: (145-156)/(83-89) 152/83   Pulse: Heart Rate:  [64-78] 64   Respirations:     SPO2:     O2 Amount (l/min):     O2 Devices     Weight: Weight:  [120 kg (265 lb)] 120 kg (265 lb)     Physical Examination: General appearance - alert, well appearing, and in no distress  Mental status - alert, oriented to person, place, and time  Chest - clear to auscultation, no wheezes, rales or  rhonchi, symmetric air entry  Heart - normal rate, regular rhythm, normal S1, S2, no murmurs, rubs, clicks or gallops  Abdomen - soft, nontender, nondistended, no masses or organomegaly  Pelvic - normal external genitalia, vulva, vagina, cervix, uterus and adnexa  Back exam - full range of motion, no tenderness, palpable spasm or pain on motion  Neurological - alert, oriented, normal speech, no focal findings or movement disorder noted  Extremities - peripheral pulses normal, no pedal edema, no clubbing or cyanosis  Skin - normal coloration and turgor, no rashes, no suspicious skin lesions noted    Presentation: Unknown   Cervix: Exam by:     Dilation:     Effacement:     Station:         Fetal Heart Rate Assessment   Method: Fetal HR Assessment Method: external   Beats/min: Fetal HR (beats/min): 135   Baseline: Fetal HR Baseline: normal range   Variability: Fetal HR Variability: moderate (amplitude range 6 to 25 bpm)   Accels: Fetal HR Accelerations: greater than/equal to 15 bpm, lasting at least 15 seconds   Decels: Fetal HR Decelerations: absent         Uterine Assessment   Method: Method: external tocotransducer, palpation   Frequency (min): Contraction Frequency (Minutes): occasional   Ctx Count in 10 min:     Duration:     Intensity: Contraction Intensity: mild by palpation       Toyah Units:       GBS is positive      Assessment & Plan     Elevated blood pressure in office      Assessment:  1.  Intrauterine pregnancy at 37w0d gestation with reactive fetal status.    2.  Gestational hypertension.  Preeclampsia labs were within normal range.  After discussion with Dr. Giron and Dr. Paredes, patient will come in tonight for induction of labor  3.  Obstetrical history significant for is non-contributory.  4.  GBS status:   Group B Strep, DNA   Date Value Ref Range Status   02/27/2025 Positive (A) Negative Final       Plan:  Return at midnight for induction  Strict fetal kick count  Labor precautions  PIH  precautions      Sterling uY MD  3/6/2025  16:57 EST

## 2025-03-06 NOTE — NON STRESS TEST
Obstetrical Non-stress Test Interpretation     Name:  Jennifer Francis  MRN: [id number]    24 y.o. female  at 37w0d    Indication: Elevated blood pressures       Fetal Assessment  Fetal Movement: active  Fetal HR Assessment Method: external  Fetal HR (beats/min): 135  Fetal HR Baseline: normal range  Fetal HR Variability: moderate (amplitude range 6 to 25 bpm)  Fetal HR Accelerations: greater than/equal to 15 bpm, lasting at least 15 seconds  Fetal HR Decelerations: absent  Sinusoidal Pattern Present: absent    /83 (BP Location: Left arm, Patient Position: Sitting)   Pulse 64   LMP 2024     Reason for test: OB Triage, Other (Comment) (elevated bp)  Date of Test: 3/6/2025  Time frame of test: 0145-6208    RN NST Interpretation:  reactive      Alivia Sargent RN  3/6/2025  13:31 EST

## 2025-03-06 NOTE — DISCHARGE INSTR - ACTIVITY
Patient instructed to come back if she has any symptoms of headache, visual changes or RUQ pain or if blood pressures remain elevated

## 2025-03-06 NOTE — NURSING NOTE
Discharge instructions given to patient at this time with no questions. Patient instructed to return to hospital if she has RUQ pain, visual changes, or headache. Patient verbalizes understanding. Patient left undelivered in stable condition via private vehicle.

## 2025-03-07 ENCOUNTER — ANESTHESIA (OUTPATIENT)
Dept: LABOR AND DELIVERY | Facility: HOSPITAL | Age: 25
End: 2025-03-07
Payer: MEDICAID

## 2025-03-07 ENCOUNTER — HOSPITAL ENCOUNTER (OUTPATIENT)
Dept: LABOR AND DELIVERY | Facility: HOSPITAL | Age: 25
Discharge: HOME | End: 2025-03-07
Payer: MEDICAID

## 2025-03-07 ENCOUNTER — PATIENT OUTREACH (OUTPATIENT)
Dept: LABOR AND DELIVERY | Facility: HOSPITAL | Age: 25
End: 2025-03-07
Payer: MEDICAID

## 2025-03-07 ENCOUNTER — HOSPITAL ENCOUNTER (INPATIENT)
Facility: HOSPITAL | Age: 25
LOS: 3 days | Discharge: HOME | End: 2025-03-10
Attending: OBSTETRICS & GYNECOLOGY | Admitting: OBSTETRICS & GYNECOLOGY
Payer: MEDICAID

## 2025-03-07 ENCOUNTER — ANESTHESIA EVENT (OUTPATIENT)
Dept: LABOR AND DELIVERY | Facility: HOSPITAL | Age: 25
End: 2025-03-07
Payer: MEDICAID

## 2025-03-07 PROBLEM — Z34.90 ENCOUNTER FOR INDUCTION OF LABOR: Status: ACTIVE | Noted: 2025-03-07

## 2025-03-07 LAB
ABO GROUP BLD: NORMAL
ALBUMIN SERPL-MCNC: 3.2 G/DL (ref 3.5–5.2)
ALBUMIN/GLOB SERPL: 1.2 G/DL
ALP SERPL-CCNC: 157 U/L (ref 39–117)
ALT SERPL W P-5'-P-CCNC: 14 U/L (ref 1–33)
AMPHET+METHAMPHET UR QL: NEGATIVE
AMPHETAMINES UR QL: NEGATIVE
ANION GAP SERPL CALCULATED.3IONS-SCNC: 14 MMOL/L (ref 5–15)
AST SERPL-CCNC: 16 U/L (ref 1–32)
ATMOSPHERIC PRESS: 736.2 MMHG
ATMOSPHERIC PRESS: 739.2 MMHG
BARBITURATES UR QL SCN: NEGATIVE
BASE EXCESS BLDCOA CALC-SCNC: -14 MMOL/L (ref -2–2)
BASE EXCESS BLDCOV CALC-SCNC: -11.8 MMOL/L (ref -30–30)
BENZODIAZ UR QL SCN: NEGATIVE
BILIRUB SERPL-MCNC: 0.2 MG/DL (ref 0–1.2)
BLD GP AB SCN SERPL QL: NEGATIVE
BUN SERPL-MCNC: 12 MG/DL (ref 6–20)
BUN/CREAT SERPL: 16.2 (ref 7–25)
BUPRENORPHINE SERPL-MCNC: NEGATIVE NG/ML
CALCIUM SPEC-SCNC: 9.7 MG/DL (ref 8.6–10.5)
CANNABINOIDS SERPL QL: NEGATIVE
CHLORIDE SERPL-SCNC: 106 MMOL/L (ref 98–107)
CO2 SERPL-SCNC: 18 MMOL/L (ref 22–29)
COCAINE UR QL: NEGATIVE
CREAT SERPL-MCNC: 0.74 MG/DL (ref 0.57–1)
DEPRECATED RDW RBC AUTO: 41.2 FL (ref 37–54)
EGFRCR SERPLBLD CKD-EPI 2021: 116.8 ML/MIN/1.73
ERYTHROCYTE [DISTWIDTH] IN BLOOD BY AUTOMATED COUNT: 13.4 % (ref 12.3–15.4)
FENTANYL UR-MCNC: NEGATIVE NG/ML
GLOBULIN UR ELPH-MCNC: 2.6 GM/DL
GLUCOSE SERPL-MCNC: 113 MG/DL (ref 65–99)
HCO3 BLDCOA-SCNC: 16.9 MMOL/L
HCO3 BLDCOV-SCNC: 17.6 MMOL/L
HCT VFR BLD AUTO: 37.6 % (ref 34–46.6)
HGB BLD-MCNC: 12.5 G/DL (ref 12–15.9)
MCH RBC QN AUTO: 28.6 PG (ref 26.6–33)
MCHC RBC AUTO-ENTMCNC: 33.2 G/DL (ref 31.5–35.7)
MCV RBC AUTO: 86 FL (ref 79–97)
METHADONE UR QL SCN: NEGATIVE
MODALITY: ABNORMAL
MODALITY: ABNORMAL
NOTIFIED WHO: ABNORMAL
OPIATES UR QL: NEGATIVE
OXYCODONE UR QL SCN: NEGATIVE
PCO2 BLDCOA: 57.9 MMHG (ref 33–49)
PCO2 BLDCOV: 51.3 MM HG (ref 35–51.3)
PCP UR QL SCN: NEGATIVE
PH BLDCOA: 7.07 PH UNITS (ref 7.18–7.34)
PH BLDCOV: 7.14 PH UNITS (ref 7.26–7.4)
PLATELET # BLD AUTO: 266 10*3/MM3 (ref 140–450)
PMV BLD AUTO: 10.5 FL (ref 6–12)
PO2 BLDCOA: 32.4 MMHG
PO2 BLDCOV: 25.1 MM HG (ref 19–39)
POTASSIUM SERPL-SCNC: 4.3 MMOL/L (ref 3.5–5.2)
PROT SERPL-MCNC: 5.8 G/DL (ref 6–8.5)
RBC # BLD AUTO: 4.37 10*6/MM3 (ref 3.77–5.28)
RH BLD: POSITIVE
SAO2 % BLDCOA: 40.4 %
SAO2 % BLDCOV: 30.2 %
SODIUM SERPL-SCNC: 138 MMOL/L (ref 136–145)
T&S EXPIRATION DATE: NORMAL
TREPONEMA PALLIDUM IGG+IGM AB [PRESENCE] IN SERUM OR PLASMA BY IMMUNOASSAY: NORMAL
TRICYCLICS UR QL SCN: NEGATIVE
WBC NRBC COR # BLD AUTO: 15.51 10*3/MM3 (ref 3.4–10.8)

## 2025-03-07 PROCEDURE — 25810000003 SODIUM CHLORIDE 0.9 % SOLUTION: Performed by: STUDENT IN AN ORGANIZED HEALTH CARE EDUCATION/TRAINING PROGRAM

## 2025-03-07 PROCEDURE — 0UQMXZZ REPAIR VULVA, EXTERNAL APPROACH: ICD-10-PCS | Performed by: STUDENT IN AN ORGANIZED HEALTH CARE EDUCATION/TRAINING PROGRAM

## 2025-03-07 PROCEDURE — 59410 OBSTETRICAL CARE: CPT | Performed by: STUDENT IN AN ORGANIZED HEALTH CARE EDUCATION/TRAINING PROGRAM

## 2025-03-07 PROCEDURE — 3E033VJ INTRODUCTION OF OTHER HORMONE INTO PERIPHERAL VEIN, PERCUTANEOUS APPROACH: ICD-10-PCS | Performed by: STUDENT IN AN ORGANIZED HEALTH CARE EDUCATION/TRAINING PROGRAM

## 2025-03-07 PROCEDURE — 25810000003 SODIUM CHLORIDE 0.9 % SOLUTION: Performed by: OBSTETRICS & GYNECOLOGY

## 2025-03-07 PROCEDURE — 25010000002 PENICILLIN G POTASSIUM PER 600000 UNITS: Performed by: OBSTETRICS & GYNECOLOGY

## 2025-03-07 PROCEDURE — C1755 CATHETER, INTRASPINAL: HCPCS | Performed by: NURSE ANESTHETIST, CERTIFIED REGISTERED

## 2025-03-07 PROCEDURE — 25810000003 LACTATED RINGERS SOLUTION: Performed by: NURSE ANESTHETIST, CERTIFIED REGISTERED

## 2025-03-07 PROCEDURE — 25010000002 OXYTOCIN PER 10 UNITS: Performed by: OBSTETRICS & GYNECOLOGY

## 2025-03-07 PROCEDURE — 25010000002 LABETALOL 5 MG/ML SOLUTION: Performed by: STUDENT IN AN ORGANIZED HEALTH CARE EDUCATION/TRAINING PROGRAM

## 2025-03-07 PROCEDURE — 86900 BLOOD TYPING SEROLOGIC ABO: CPT | Performed by: OBSTETRICS & GYNECOLOGY

## 2025-03-07 PROCEDURE — 86901 BLOOD TYPING SEROLOGIC RH(D): CPT | Performed by: OBSTETRICS & GYNECOLOGY

## 2025-03-07 PROCEDURE — 25010000002 OXYTOCIN PER 10 UNITS: Performed by: STUDENT IN AN ORGANIZED HEALTH CARE EDUCATION/TRAINING PROGRAM

## 2025-03-07 PROCEDURE — 86780 TREPONEMA PALLIDUM: CPT | Performed by: OBSTETRICS & GYNECOLOGY

## 2025-03-07 PROCEDURE — 3E0DXGC INTRODUCTION OF OTHER THERAPEUTIC SUBSTANCE INTO MOUTH AND PHARYNX, EXTERNAL APPROACH: ICD-10-PCS | Performed by: STUDENT IN AN ORGANIZED HEALTH CARE EDUCATION/TRAINING PROGRAM

## 2025-03-07 PROCEDURE — 25810000003 LACTATED RINGERS PER 1000 ML: Performed by: OBSTETRICS & GYNECOLOGY

## 2025-03-07 PROCEDURE — 86850 RBC ANTIBODY SCREEN: CPT | Performed by: OBSTETRICS & GYNECOLOGY

## 2025-03-07 PROCEDURE — 85027 COMPLETE CBC AUTOMATED: CPT | Performed by: OBSTETRICS & GYNECOLOGY

## 2025-03-07 PROCEDURE — 51702 INSERT TEMP BLADDER CATH: CPT

## 2025-03-07 PROCEDURE — 25010000002 MORPHINE PER 10 MG: Performed by: OBSTETRICS & GYNECOLOGY

## 2025-03-07 PROCEDURE — 80307 DRUG TEST PRSMV CHEM ANLYZR: CPT | Performed by: OBSTETRICS & GYNECOLOGY

## 2025-03-07 PROCEDURE — 80053 COMPREHEN METABOLIC PANEL: CPT | Performed by: OBSTETRICS & GYNECOLOGY

## 2025-03-07 PROCEDURE — 82803 BLOOD GASES ANY COMBINATION: CPT | Performed by: OBSTETRICS & GYNECOLOGY

## 2025-03-07 RX ORDER — HYDROCODONE BITARTRATE AND ACETAMINOPHEN 5; 325 MG/1; MG/1
1 TABLET ORAL EVERY 4 HOURS PRN
Status: DISCONTINUED | OUTPATIENT
Start: 2025-03-07 | End: 2025-03-07

## 2025-03-07 RX ORDER — SODIUM CHLORIDE 0.9 % (FLUSH) 0.9 %
10 SYRINGE (ML) INJECTION AS NEEDED
Status: DISCONTINUED | OUTPATIENT
Start: 2025-03-07 | End: 2025-03-07

## 2025-03-07 RX ORDER — PROMETHAZINE HYDROCHLORIDE 25 MG/1
12.5 TABLET ORAL EVERY 4 HOURS PRN
Status: DISCONTINUED | OUTPATIENT
Start: 2025-03-07 | End: 2025-03-10 | Stop reason: HOSPADM

## 2025-03-07 RX ORDER — PROMETHAZINE HYDROCHLORIDE 25 MG/1
12.5 TABLET ORAL EVERY 6 HOURS PRN
Status: DISCONTINUED | OUTPATIENT
Start: 2025-03-07 | End: 2025-03-07

## 2025-03-07 RX ORDER — MAGNESIUM SULFATE HEPTAHYDRATE 40 MG/ML
2 INJECTION, SOLUTION INTRAVENOUS CONTINUOUS
Status: DISCONTINUED | OUTPATIENT
Start: 2025-03-07 | End: 2025-03-08

## 2025-03-07 RX ORDER — BISACODYL 10 MG
10 SUPPOSITORY, RECTAL RECTAL DAILY PRN
Status: DISCONTINUED | OUTPATIENT
Start: 2025-03-08 | End: 2025-03-10 | Stop reason: HOSPADM

## 2025-03-07 RX ORDER — DOCUSATE SODIUM 100 MG/1
100 CAPSULE, LIQUID FILLED ORAL DAILY
Status: DISCONTINUED | OUTPATIENT
Start: 2025-03-08 | End: 2025-03-10 | Stop reason: HOSPADM

## 2025-03-07 RX ORDER — CALCIUM CARBONATE 500 MG/1
2 TABLET, CHEWABLE ORAL 3 TIMES DAILY PRN
Status: DISCONTINUED | OUTPATIENT
Start: 2025-03-07 | End: 2025-03-10 | Stop reason: HOSPADM

## 2025-03-07 RX ORDER — LIDOCAINE HYDROCHLORIDE AND EPINEPHRINE 15; 5 MG/ML; UG/ML
INJECTION, SOLUTION EPIDURAL
Status: COMPLETED | OUTPATIENT
Start: 2025-03-07 | End: 2025-03-07

## 2025-03-07 RX ORDER — ONDANSETRON 4 MG/1
4 TABLET, ORALLY DISINTEGRATING ORAL EVERY 6 HOURS PRN
Status: DISCONTINUED | OUTPATIENT
Start: 2025-03-07 | End: 2025-03-07

## 2025-03-07 RX ORDER — FAMOTIDINE 20 MG/1
20 TABLET, FILM COATED ORAL ONCE AS NEEDED
Status: DISCONTINUED | OUTPATIENT
Start: 2025-03-07 | End: 2025-03-07

## 2025-03-07 RX ORDER — METHYLERGONOVINE MALEATE 0.2 MG/ML
200 INJECTION INTRAVENOUS ONCE AS NEEDED
Status: DISCONTINUED | OUTPATIENT
Start: 2025-03-07 | End: 2025-03-07

## 2025-03-07 RX ORDER — SODIUM CHLORIDE, SODIUM LACTATE, POTASSIUM CHLORIDE, CALCIUM CHLORIDE 600; 310; 30; 20 MG/100ML; MG/100ML; MG/100ML; MG/100ML
125 INJECTION, SOLUTION INTRAVENOUS CONTINUOUS
Status: DISCONTINUED | OUTPATIENT
Start: 2025-03-07 | End: 2025-03-08

## 2025-03-07 RX ORDER — OXYTOCIN/0.9 % SODIUM CHLORIDE 30/500 ML
999 PLASTIC BAG, INJECTION (ML) INTRAVENOUS ONCE
Status: COMPLETED | OUTPATIENT
Start: 2025-03-07 | End: 2025-03-07

## 2025-03-07 RX ORDER — ACETAMINOPHEN 325 MG/1
650 TABLET ORAL EVERY 6 HOURS PRN
Status: DISCONTINUED | OUTPATIENT
Start: 2025-03-07 | End: 2025-03-10 | Stop reason: HOSPADM

## 2025-03-07 RX ORDER — OXYTOCIN/0.9 % SODIUM CHLORIDE 30/500 ML
2 PLASTIC BAG, INJECTION (ML) INTRAVENOUS
Status: DISCONTINUED | OUTPATIENT
Start: 2025-03-07 | End: 2025-03-07

## 2025-03-07 RX ORDER — SODIUM CHLORIDE 9 MG/ML
40 INJECTION, SOLUTION INTRAVENOUS AS NEEDED
Status: DISCONTINUED | OUTPATIENT
Start: 2025-03-07 | End: 2025-03-07

## 2025-03-07 RX ORDER — LABETALOL HYDROCHLORIDE 5 MG/ML
20-80 INJECTION, SOLUTION INTRAVENOUS
Status: DISCONTINUED | OUTPATIENT
Start: 2025-03-07 | End: 2025-03-10 | Stop reason: HOSPADM

## 2025-03-07 RX ORDER — FAMOTIDINE 10 MG/ML
20 INJECTION, SOLUTION INTRAVENOUS 2 TIMES DAILY PRN
Status: DISCONTINUED | OUTPATIENT
Start: 2025-03-07 | End: 2025-03-07

## 2025-03-07 RX ORDER — ACETAMINOPHEN 325 MG/1
650 TABLET ORAL EVERY 4 HOURS PRN
Status: DISCONTINUED | OUTPATIENT
Start: 2025-03-07 | End: 2025-03-07

## 2025-03-07 RX ORDER — IBUPROFEN 800 MG/1
800 TABLET, FILM COATED ORAL ONCE AS NEEDED
Status: DISCONTINUED | OUTPATIENT
Start: 2025-03-07 | End: 2025-03-07

## 2025-03-07 RX ORDER — OXYTOCIN/0.9 % SODIUM CHLORIDE 30/500 ML
125 PLASTIC BAG, INJECTION (ML) INTRAVENOUS ONCE AS NEEDED
Status: DISCONTINUED | OUTPATIENT
Start: 2025-03-07 | End: 2025-03-10 | Stop reason: HOSPADM

## 2025-03-07 RX ORDER — ONDANSETRON 4 MG/1
4 TABLET, ORALLY DISINTEGRATING ORAL EVERY 8 HOURS PRN
Status: DISCONTINUED | OUTPATIENT
Start: 2025-03-07 | End: 2025-03-10 | Stop reason: HOSPADM

## 2025-03-07 RX ORDER — NIFEDIPINE 10 MG/1
10-20 CAPSULE ORAL
Status: DISCONTINUED | OUTPATIENT
Start: 2025-03-07 | End: 2025-03-10 | Stop reason: HOSPADM

## 2025-03-07 RX ORDER — MORPHINE SULFATE 10 MG/ML
5 INJECTION INTRAMUSCULAR; INTRAVENOUS; SUBCUTANEOUS
Status: DISCONTINUED | OUTPATIENT
Start: 2025-03-07 | End: 2025-03-07

## 2025-03-07 RX ORDER — ONDANSETRON 2 MG/ML
4 INJECTION INTRAMUSCULAR; INTRAVENOUS EVERY 6 HOURS PRN
Status: DISCONTINUED | OUTPATIENT
Start: 2025-03-07 | End: 2025-03-07

## 2025-03-07 RX ORDER — CITRIC ACID/SODIUM CITRATE 334-500MG
30 SOLUTION, ORAL ORAL ONCE AS NEEDED
Status: DISCONTINUED | OUTPATIENT
Start: 2025-03-07 | End: 2025-03-07

## 2025-03-07 RX ORDER — TERBUTALINE SULFATE 1 MG/ML
0.25 INJECTION SUBCUTANEOUS AS NEEDED
Status: DISCONTINUED | OUTPATIENT
Start: 2025-03-07 | End: 2025-03-07

## 2025-03-07 RX ORDER — FAMOTIDINE 10 MG/ML
20 INJECTION, SOLUTION INTRAVENOUS ONCE AS NEEDED
Status: DISCONTINUED | OUTPATIENT
Start: 2025-03-07 | End: 2025-03-07

## 2025-03-07 RX ORDER — FAMOTIDINE 20 MG/1
20 TABLET, FILM COATED ORAL 2 TIMES DAILY PRN
Status: DISCONTINUED | OUTPATIENT
Start: 2025-03-07 | End: 2025-03-07

## 2025-03-07 RX ORDER — METOCLOPRAMIDE HYDROCHLORIDE 5 MG/ML
10 INJECTION INTRAMUSCULAR; INTRAVENOUS ONCE AS NEEDED
Status: DISCONTINUED | OUTPATIENT
Start: 2025-03-07 | End: 2025-03-07

## 2025-03-07 RX ORDER — LIDOCAINE HYDROCHLORIDE 10 MG/ML
0.5 INJECTION, SOLUTION EPIDURAL; INFILTRATION; INTRACAUDAL; PERINEURAL ONCE AS NEEDED
Status: DISCONTINUED | OUTPATIENT
Start: 2025-03-07 | End: 2025-03-07

## 2025-03-07 RX ORDER — HYDROCODONE BITARTRATE AND ACETAMINOPHEN 10; 325 MG/1; MG/1
1 TABLET ORAL EVERY 4 HOURS PRN
Status: DISCONTINUED | OUTPATIENT
Start: 2025-03-07 | End: 2025-03-07

## 2025-03-07 RX ORDER — MAGNESIUM CARB/ALUMINUM HYDROX 105-160MG
30 TABLET,CHEWABLE ORAL ONCE
Status: DISCONTINUED | OUTPATIENT
Start: 2025-03-07 | End: 2025-03-07

## 2025-03-07 RX ORDER — CARBOPROST TROMETHAMINE 250 UG/ML
INJECTION, SOLUTION INTRAMUSCULAR
Status: DISPENSED
Start: 2025-03-07 | End: 2025-03-08

## 2025-03-07 RX ORDER — IBUPROFEN 600 MG/1
600 TABLET, FILM COATED ORAL EVERY 6 HOURS PRN
Status: DISCONTINUED | OUTPATIENT
Start: 2025-03-07 | End: 2025-03-10 | Stop reason: HOSPADM

## 2025-03-07 RX ORDER — HYDRALAZINE HYDROCHLORIDE 20 MG/ML
5-10 INJECTION INTRAMUSCULAR; INTRAVENOUS
Status: DISCONTINUED | OUTPATIENT
Start: 2025-03-07 | End: 2025-03-10 | Stop reason: HOSPADM

## 2025-03-07 RX ORDER — SODIUM CHLORIDE 0.9 % (FLUSH) 0.9 %
10 SYRINGE (ML) INJECTION EVERY 12 HOURS SCHEDULED
Status: DISCONTINUED | OUTPATIENT
Start: 2025-03-07 | End: 2025-03-07

## 2025-03-07 RX ORDER — ACETAMINOPHEN 325 MG/1
650 TABLET ORAL ONCE AS NEEDED
Status: DISCONTINUED | OUTPATIENT
Start: 2025-03-07 | End: 2025-03-07

## 2025-03-07 RX ORDER — OXYTOCIN/0.9 % SODIUM CHLORIDE 30/500 ML
250 PLASTIC BAG, INJECTION (ML) INTRAVENOUS CONTINUOUS
Status: ACTIVE | OUTPATIENT
Start: 2025-03-07 | End: 2025-03-07

## 2025-03-07 RX ORDER — MISOPROSTOL 200 UG/1
800 TABLET ORAL AS NEEDED
Status: DISCONTINUED | OUTPATIENT
Start: 2025-03-07 | End: 2025-03-10 | Stop reason: HOSPADM

## 2025-03-07 RX ORDER — CALCIUM GLUCONATE 94 MG/ML
1 INJECTION, SOLUTION INTRAVENOUS ONCE AS NEEDED
Status: DISCONTINUED | OUTPATIENT
Start: 2025-03-07 | End: 2025-03-08

## 2025-03-07 RX ORDER — EPHEDRINE SULFATE 50 MG/ML
5 INJECTION, SOLUTION INTRAVENOUS
Status: DISCONTINUED | OUTPATIENT
Start: 2025-03-07 | End: 2025-03-07

## 2025-03-07 RX ORDER — MISOPROSTOL 100 MCG
50 TABLET ORAL
Status: DISCONTINUED | OUTPATIENT
Start: 2025-03-07 | End: 2025-03-07

## 2025-03-07 RX ORDER — PROMETHAZINE HYDROCHLORIDE 25 MG/1
25 TABLET ORAL EVERY 6 HOURS PRN
Status: DISCONTINUED | OUTPATIENT
Start: 2025-03-07 | End: 2025-03-07

## 2025-03-07 RX ORDER — SODIUM CHLORIDE, SODIUM LACTATE, POTASSIUM CHLORIDE, CALCIUM CHLORIDE 600; 310; 30; 20 MG/100ML; MG/100ML; MG/100ML; MG/100ML
125 INJECTION, SOLUTION INTRAVENOUS CONTINUOUS
Status: ACTIVE | OUTPATIENT
Start: 2025-03-07 | End: 2025-03-07

## 2025-03-07 RX ORDER — SODIUM CHLORIDE 0.9 % (FLUSH) 0.9 %
1-10 SYRINGE (ML) INJECTION AS NEEDED
Status: DISCONTINUED | OUTPATIENT
Start: 2025-03-07 | End: 2025-03-10 | Stop reason: HOSPADM

## 2025-03-07 RX ADMIN — Medication 50 MCG: at 00:53

## 2025-03-07 RX ADMIN — MORPHINE SULFATE 5 MG: 10 INJECTION INTRAVENOUS at 13:53

## 2025-03-07 RX ADMIN — SODIUM CHLORIDE, SODIUM LACTATE, POTASSIUM CHLORIDE, CALCIUM CHLORIDE 125 ML/HR: 20; 30; 600; 310 INJECTION, SOLUTION INTRAVENOUS at 00:47

## 2025-03-07 RX ADMIN — SODIUM CHLORIDE 2.5 MILLION UNITS: 9 INJECTION, SOLUTION INTRAVENOUS at 09:14

## 2025-03-07 RX ADMIN — SODIUM CHLORIDE 2.5 MILLION UNITS: 9 INJECTION, SOLUTION INTRAVENOUS at 04:46

## 2025-03-07 RX ADMIN — SODIUM CHLORIDE, SODIUM LACTATE, POTASSIUM CHLORIDE, AND CALCIUM CHLORIDE 1000 ML: .6; .31; .03; .02 INJECTION, SOLUTION INTRAVENOUS at 16:21

## 2025-03-07 RX ADMIN — MISOPROSTOL 800 MCG: 200 TABLET ORAL at 19:59

## 2025-03-07 RX ADMIN — Medication 10 ML/HR: at 14:34

## 2025-03-07 RX ADMIN — LABETALOL HYDROCHLORIDE 20 MG: 5 INJECTION, SOLUTION INTRAVENOUS at 18:49

## 2025-03-07 RX ADMIN — EPHEDRINE SULFATE 5 MG: 50 INJECTION INTRAVENOUS at 14:54

## 2025-03-07 RX ADMIN — LIDOCAINE HYDROCHLORIDE AND EPINEPHRINE 2 ML: 15; 5 INJECTION, SOLUTION EPIDURAL at 14:27

## 2025-03-07 RX ADMIN — SODIUM CHLORIDE 999 ML/HR: 9 INJECTION, SOLUTION INTRAVENOUS at 19:48

## 2025-03-07 RX ADMIN — SODIUM CHLORIDE 2.5 MILLION UNITS: 9 INJECTION, SOLUTION INTRAVENOUS at 17:00

## 2025-03-07 RX ADMIN — PENICILLIN G POTASSIUM 5 MILLION UNITS: 5000000 INJECTION, POWDER, FOR SOLUTION INTRAMUSCULAR; INTRAVENOUS at 00:47

## 2025-03-07 RX ADMIN — LIDOCAINE HYDROCHLORIDE AND EPINEPHRINE 3 ML: 15; 5 INJECTION, SOLUTION EPIDURAL at 14:32

## 2025-03-07 RX ADMIN — SODIUM CHLORIDE 2 MILLI-UNITS/MIN: 9 INJECTION, SOLUTION INTRAVENOUS at 09:44

## 2025-03-07 RX ADMIN — Medication 50 MCG: at 04:52

## 2025-03-07 RX ADMIN — SODIUM CHLORIDE, SODIUM LACTATE, POTASSIUM CHLORIDE, CALCIUM CHLORIDE 125 ML/HR: 20; 30; 600; 310 INJECTION, SOLUTION INTRAVENOUS at 09:46

## 2025-03-07 RX ADMIN — SODIUM CHLORIDE 2.5 MILLION UNITS: 9 INJECTION, SOLUTION INTRAVENOUS at 13:03

## 2025-03-07 RX ADMIN — MORPHINE SULFATE 5 MG: 10 INJECTION INTRAVENOUS at 06:24

## 2025-03-07 NOTE — ANESTHESIA PREPROCEDURE EVALUATION
Anesthesia Evaluation     NPO Solid Status: > 8 hours  NPO Liquid Status: < 2 hours           Airway   Mallampati: II  TM distance: >3 FB  Neck ROM: full  No difficulty expected  Dental - normal exam     Pulmonary - negative pulmonary ROS and normal exam    breath sounds clear to auscultation  Cardiovascular - normal exam  Exercise tolerance: good (4-7 METS)    Rhythm: regular  Rate: normal    (+) hypertension      Neuro/Psych  GI/Hepatic/Renal/Endo    (+) morbid obesity    Musculoskeletal (-) negative ROS    Abdominal    Substance History - negative use     OB/GYN    (+) Pregnant, pregnancy induced hypertension        Other                    Anesthesia Plan    ASA 3     epidural       Anesthetic plan, risks, benefits, and alternatives have been provided, discussed and informed consent has been obtained with: patient.    CODE STATUS:    Code Status (Patient has no pulse and is not breathing): CPR (Attempt to Resuscitate)  Medical Interventions (Patient has pulse or is breathing): Full

## 2025-03-07 NOTE — H&P
Obstetric History and Physical     Jennifer Penny is a 24 y.o.  at 37w1d weeks EGA.  She presents for IOL for gestational HTN and increasing Bps in triage yesterday She denies any si/sx of preeclampsia and labs WNL yesterday. Patient denies any vaginal bleeding, loss of fluid or decreased fetal movements.  Her ACOG is reviewed and current.    Past Medical History:   Diagnosis Date    Anxiety and depression 2024    Chlamydia     Chronic fatigue syndrome 2023    Chronic idiopathic constipation 2023    SAB (spontaneous ) 2024    Completed  2024 8weeks by LMP, SAB, no D+C         Past Surgical History:   Procedure Laterality Date    ANKLE SURGERY Right        Family History   Problem Relation Age of Onset    Hypertension Father     Breast cancer Neg Hx     Ovarian cancer Neg Hx     Uterine cancer Neg Hx     Colon cancer Neg Hx     Diabetes Neg Hx     Deep vein thrombosis Neg Hx     Pulmonary embolism Neg Hx        Social History     Tobacco Use    Smoking status: Never    Smokeless tobacco: Never   Vaping Use    Vaping status: Every Day    Substances: Nicotine, Flavoring    Devices: Disposable   Substance Use Topics    Alcohol use: Never    Drug use: Never       Prior to Admission Meds: (Last known outpatient meds at time of note signature)  Prior to Admission medications    Medication Sig Start Date End Date Taking? Authorizing Provider   Prenatal MV-Min-Fe Fum-FA-DHA (Prenatal Multivitamin + DHA) 28-0.8 & 200 MG misc Take 1 tablet by mouth Daily. 24  Yes Kesha Billy, DO   Hydrocortisone Ace-Pramoxine 1-1 % rectal cream Insert  into the rectum 3 (Three) Times a Day As Needed for Hemorrhoids. 24   Kesha Billy DO   nicotine (Nicoderm CQ) 7 MG/24HR patch Place 1 patch on the skin as directed by provider Daily. 24   Kesha Billy, DO   ondansetron ODT (ZOFRAN-ODT) 4 MG disintegrating tablet Place 1 tablet on the tongue Every 8 (Eight) Hours As Needed for Nausea or  Vomiting. 25   Ivelisse Samuels MD         ROS:    General ROS: negative for - chills or fatigue  Ophthalmic ROS: negative for - blurry vision or decreased vision  ENT ROS: negative for - epistaxis, headaches or hearing change  Allergy and Immunology ROS: negative for - hives or insect bite sensitivity  Hematological and Lymphatic ROS: negative for - bleeding problems or blood clots  Endocrine ROS: negative for - breast changes or galactorrhea  Breast ROS: negative for - galactorrhea or new or changing breast lumps  Respiratory ROS: negative for - cough or hemoptysis  Cardiovascular ROS: negative for - chest pain or dyspnea on exertion  Gastrointestinal ROS: negative for - abdominal pain or appetite loss  Genito-Urinary ROS: negative for - change in urinary stream, dysuria   Musculoskeletal ROS: negative for - gait disturbance or joint pain  Neurological ROS: negative for - behavioral changes or bowel and bladder control changes  Dermatological ROS: negative for acne and dry skin      Vitals:    25 0430   BP: 148/89   Pulse: 71   SpO2: 97%       Physical Exam   General- NAD, alert and oriented, appropriate  Psych- Normal mood, good memory  CV- Regular rhythm, no murnurs  Resp- CTA to bases, no wheezes  Abdomen- NABS, soft, non distended, non tender, no masses  Abdomen- Gravid, non tender  Fundus-  Size: consistent w dates.  Non tender.  Cvx- 1cm / 70% / -2, cooks balloon placed, cervical balloon only with 80cc  Presentation- VTX  Ext/DTRs- No edema    Fetal HR: Category I  Contractions: Regular    Plans reviewed and discussed with Jennifer who is in agreement.        ASSESSMENT:  37w1d Gestational HTN  Obesity   Scheduled IOL  GBS: Positive      PLAN:  Admitted to L&D  Delivery: See labor orders, plan for   ANTIBIOTICS for GBS    Plan of care [facility] hospital course, R/B/A/potential SE, suspected length 24-48+hrs have been reviewed with patient and any family or friends present, questions answered to  her/his/their satisfaction.  Pt desires to proceed as above.    .rciool      Liza Paredes, DO

## 2025-03-07 NOTE — PLAN OF CARE
Problem: Adult Inpatient Plan of Care  Goal: Plan of Care Review  Outcome: Progressing  Goal: Patient-Specific Goal (Individualized)  Outcome: Progressing  Goal: Absence of Hospital-Acquired Illness or Injury  Outcome: Progressing  Intervention: Identify and Manage Fall Risk  Recent Flowsheet Documentation  Taken 3/7/2025 0100 by Clementina Thibodeaux RN  Safety Promotion/Fall Prevention:   safety round/check completed   room organization consistent  Goal: Optimal Comfort and Wellbeing  Outcome: Progressing  Intervention: Provide Person-Centered Care  Recent Flowsheet Documentation  Taken 3/7/2025 0100 by Clementina Thibodeaux RN  Trust Relationship/Rapport:   care explained   choices provided   emotional support provided   empathic listening provided   questions answered   questions encouraged   reassurance provided   thoughts/feelings acknowledged  Goal: Readiness for Transition of Care  Outcome: Progressing  Intervention: Mutually Develop Transition Plan  Recent Flowsheet Documentation  Taken 3/7/2025 0031 by Clementina Thibodeaux RN  Equipment Currently Used at Home: bp cuff  Taken 3/7/2025 0030 by Clementina Thibodeaux RN  Transportation Anticipated:   car, drives self   family or friend will provide  Patient/Family Anticipated Services at Transition: none  Patient/Family Anticipates Transition to:   home   home with family     Problem: Labor  Goal: Hemostasis  Outcome: Progressing  Goal: Stable Fetal Wellbeing  Outcome: Progressing  Goal: Effective Progression to Delivery  Outcome: Progressing  Goal: Absence of Infection Signs and Symptoms  Outcome: Progressing  Goal: Acceptable Pain Control  Outcome: Progressing  Goal: Normal Uterine Contraction Pattern  Outcome: Progressing   Goal Outcome Evaluation: cxns irregular. Pt comfortable with IV morphine. Labor progressing adequately

## 2025-03-07 NOTE — PROGRESS NOTES
OB Intrapartum Note    Subjective: No complaints, s/p epidural placement    Objective:  Baseline: Normal 110-160 bpm  Variability:   Moderate/Normal (amplitude 6-25 bpm)  Accelerations: Absent   Decelerations: Variable, Late intermittent due to BP after epidural  Contractions:  Regular, q2min    Cervical exam:    Dilation: 6cm    Effacement: 90%    Station: -1  Planning for AROM however appears SROM already occurred.     Impression:    Category II  Reassuring fetus, FSE placed, IUPC placed    Plan:   Continue pitocin  Active labor positioning  Pelvis feels clinically adequate  Reviewed course/progress/plan with pt.  All questions answered to pts satisfaction.  Pt desires to proceed as outlined.  I have discussed in detail with patient the current plan to include, but NLT, appropriate expectations for labor and delivery, to include possible length of time expected for delivery and hospital stay.  All questions have been answered to pts satisfaction and pt desires to proceed as noted.        Electronically signed by Carol Giron DO, 03/07/25, 3:06 PM EST.

## 2025-03-07 NOTE — OUTREACH NOTE
[facility]  IP Antepartum    Current Estimated Gestational Age: 37w1d      Questions/Answers      Flowsheet Row Responses   Best Method for Contacting Cell   Support Person Present No   Understanding of diagnosis/reason for admission: Patient understands, no questions or concerns at this time   Additional Comments: Patient currently in labor and delivery for induction   Follow-up with patient in: 25        [facility]  IP Postpartum    Questions/Answers      Flowsheet Row Responses   Best Method for Contacting Cell   Support Person Present No   Is the patient going to use Meds to Beds? Yes   Any concerns related discharge meds/ability to  prescriptions? No   OB Discharge Navigator Reviewed  Not Reviewed   Comment currently in labor and delivery, not delivered yet   Confirm Postpartum OB appointment No   Confirm initial well-child Pediatrician appointment date/time: No   Does patient have transportation to appointments? Yes   Any other assistance needed to ensure she is able to attend appointments? No   Does patient have supplies needed at home for  care? Clothing, Crib, Diapers            Danni Zimmer RN  Maternity Nurse Navigator    3/7/2025, 11:42 EST

## 2025-03-07 NOTE — PLAN OF CARE
Goal Outcome Evaluation:  Plan of Care Reviewed With: patient           Outcome Evaluation: Pt tolerating labor well, comfortable with epidural, tolerating Pitocin well. Vitals stable. Significant other present and supportive at b/s. Questions answered.

## 2025-03-07 NOTE — ANESTHESIA PROCEDURE NOTES
Labor Epidural      Patient reassessed immediately prior to procedure    Patient location during procedure: OB  Start Time: 3/7/2025 2:17 PM  Stop Time: 3/7/2025 2:35 PM  Performed By  CRNA/CAA: Christopher Garduno CRNA  Preanesthetic Checklist  Completed: patient identified, IV checked, risks and benefits discussed, surgical consent, monitors and equipment checked, pre-op evaluation and timeout performed  Additional Notes  Fist attempt no redirections  Prep:  Pt Position:sitting  Sterile Tech:cap, gloves, sterile barrier, mask and gown  Prep:povidone-iodine 7.5% surgical scrub  Monitoring:blood pressure monitoring, continuous pulse oximetry and EKG  Epidural Block Procedure:  Approach:midline  Guidance:landmark technique and palpation technique  Location:L3-L4  Needle Type:Tuohy  Needle Gauge:17 G  Loss of Resistance Medium: saline  Loss of Resistance: 12cm  Catheter at skin depth (cm): 18.  Paresthesia: none  Aspiration:negative  Test Dose:negative  Medication: lidocaine 1.5%-EPINEPHrine 1:200,000 (XYLOCAINE W/EPI) injection - Epidural   2 mL - 3/7/2025 2:27:00 PM   3 mL - 3/7/2025 2:32:00 PM  Number of Attempts: 1  Post Assessment:  Dressing:occlusive dressing applied and secured with tape  Pt Tolerance:patient tolerated the procedure well with no apparent complications  Complications:no

## 2025-03-08 PROCEDURE — 25810000003 LACTATED RINGERS SOLUTION: Performed by: STUDENT IN AN ORGANIZED HEALTH CARE EDUCATION/TRAINING PROGRAM

## 2025-03-08 PROCEDURE — 25810000003 LACTATED RINGERS PER 1000 ML: Performed by: STUDENT IN AN ORGANIZED HEALTH CARE EDUCATION/TRAINING PROGRAM

## 2025-03-08 PROCEDURE — 25010000002 MAGNESIUM SULFATE 20 GM/500ML SOLUTION: Performed by: STUDENT IN AN ORGANIZED HEALTH CARE EDUCATION/TRAINING PROGRAM

## 2025-03-08 RX ADMIN — ACETAMINOPHEN 650 MG: 325 TABLET ORAL at 11:58

## 2025-03-08 RX ADMIN — MAGNESIUM SULFATE IN WATER 2 G/HR: 20 INJECTION, SOLUTION INTRAVENOUS at 10:49

## 2025-03-08 RX ADMIN — ACETAMINOPHEN 650 MG: 325 TABLET ORAL at 18:32

## 2025-03-08 RX ADMIN — DOCUSATE SODIUM 100 MG: 100 CAPSULE, LIQUID FILLED ORAL at 09:09

## 2025-03-08 RX ADMIN — SODIUM CHLORIDE, SODIUM LACTATE, POTASSIUM CHLORIDE, AND CALCIUM CHLORIDE 75 ML/HR: .6; .31; .03; .02 INJECTION, SOLUTION INTRAVENOUS at 02:21

## 2025-03-08 RX ADMIN — BENZOCAINE AND LEVOMENTHOL: 200; 5 SPRAY TOPICAL at 02:20

## 2025-03-08 RX ADMIN — MAGNESIUM SULFATE IN WATER 2 G/HR: 20 INJECTION, SOLUTION INTRAVENOUS at 02:21

## 2025-03-08 RX ADMIN — IBUPROFEN 600 MG: 600 TABLET, FILM COATED ORAL at 21:26

## 2025-03-08 RX ADMIN — WITCH HAZEL: 500 SOLUTION RECTAL; TOPICAL at 02:20

## 2025-03-08 RX ADMIN — IBUPROFEN 600 MG: 600 TABLET, FILM COATED ORAL at 15:03

## 2025-03-08 RX ADMIN — SODIUM CHLORIDE, SODIUM LACTATE, POTASSIUM CHLORIDE, AND CALCIUM CHLORIDE 75 ML: .6; .31; .03; .02 INJECTION, SOLUTION INTRAVENOUS at 14:55

## 2025-03-08 RX ADMIN — IBUPROFEN 600 MG: 600 TABLET, FILM COATED ORAL at 09:09

## 2025-03-08 NOTE — PROGRESS NOTES
" Davies  Vaginal Delivery Progress Note    Subjective   Postpartum Day 1: Vaginal Delivery    The patient feels okay.  Tolerating magnesium sulfate.  Denies any shortness of breath.  No headache, visual disturbance, right upper quadrant pain        Objective     Vital Signs Range for the last 24 hours  Temperature: Temp:  [98 °F (36.7 °C)-98.9 °F (37.2 °C)] 98.3 °F (36.8 °C)   Temp Source: Temp src: Oral   BP: BP: ()/() 150/88   Pulse: Heart Rate:  [] 91   Respirations: Resp:  [14-20] 16   SPO2: SpO2:  [92 %-100 %] 99 %   O2 Amount (l/min):     O2 Devices Device (Oxygen Therapy): room air   Weight:       Admit Height:  Height: 157.5 cm (62\")      Physical Exam:  General:  no acute distress.  Abdomen: abdomen is soft without significant tenderness, masses, organomegaly or guarding. Fundus: appropriate, firm, non tender  Extremities: normal, atraumatic, no cyanosis, and trace edema.       Lab results reviewed:  Yes   Rubella:  No results found for: \"RUBELLAIGGIN\" Nurse Transcribed from prenatal record --    Rubella Antibodies, IgG   Date Value Ref Range Status   09/17/2024 1.21 Immune >0.99 index Final     Comment:                                     Non-immune       <0.90                                  Equivocal  0.90 - 0.99                                  Immune           >0.99     Rh Status:    RH type   Date Value Ref Range Status   03/07/2025 Positive  Final     Immunizations:   There is no immunization history on file for this patient.    Assessment & Plan       Encounter for induction of labor      Autumn Mitchem is Day 1  post-partum  Vaginal, Spontaneous  .  Preeclampsia on magnesium sulfate for full 24 hours.    Plan:  Continue current care.      Sterling Yu MD  3/8/2025  18:20 EST  "

## 2025-03-08 NOTE — LACTATION NOTE
LN in to see this P1 patient. Infant showing signs of hunger and reviewed these with patient and infant's father. Placed infant skin to skin and gained permission from patient to assist with latching infant. Deep latching techniques reviewed and patient able to successfully latch infant in cross-cradle hold to left breast for 20 mins. and to the right breast for 10 mins. Discussed burping techniques and patient able to successfully burp infant. Discussed attempting to feed baby on demand or at least every 2-3 hours. Encouraged her to do awake skin to skin as much as possible. LN discussed normal  feeding behavior and expected output during the first few days of breastfeeding. Discussed cluster feeding and hand expression. Encouraged pt. to call out as needed for LN/staff assistance. Patient verbalized good understanding.

## 2025-03-08 NOTE — PROGRESS NOTES
Late Entry Progress Note    Patient's blood pressures noted to be severe range around 1800 with repeats being severe range. Hypertensive protocol was initiated and magnesium bolus was given. Labetalol 20mg IV was administered. Repeat blood pressures were all mild range. Will continue magnesium drip and continue to monitor blood pressures. Patient remains asymptomatic.     Carol Giron, DO

## 2025-03-08 NOTE — L&D DELIVERY NOTE
VAGINAL DELIVERY NOTE          Delivery Date: 3/7/2025  Delivery Time: 7:43 PM  Delivery Type: Spontaneous vaginal delivery  Gestational Age: 37w1d  Delivery Provider: Carol Giron  Induced Onset of Labor    Encounter for induction of labor       Anesthesia: Epidural    Infant: male infant   2615 g (5 lb 12.2 oz)  APGARS: 8  @ 1 minute / 9  @ 5 minutes  Shoulder Dystocia: No      Placenta, Cord, and Fluid    Placenta Delivered:  Spontaneous at   3/7/2025  7:48 PM    Cord: 3 vessels present.   Nuchal Cord:  no   Cord Blood Obtained: Yes   Cord Gases Obtained:  Yes   Cord Gas Results: Venous:    pH, Cord Venous   Date Value Ref Range Status   2025 7.142 (L) 7.260 - 7.400 pH Units Final     Base Excess, Cord Venous   Date Value Ref Range Status   2025 -11.8 -30.0 - 30.0 mmol/L Final     Comment:     Serial Number: [id number]:  [id number]       Arterial:    pH, Cord Arterial   Date Value Ref Range Status   2025 7.07 (L) 7.18 - 7.34 pH Units Final     Base Exc, Cord Arterial   Date Value Ref Range Status   2025 -14.0 (C) -2.0 - 2.0 mmol/L Final     Comment:     Serial Number: [id number]:  [id number]          Perineum: Bilateral periurethral lacerations    EBL: Est. Blood Loss (mL): 150 mL (Filed from Delivery Summary) (25)    Complications: None    Description of Procedure:   The patient is a -0-1-0 at 37 weeks 1 day gestation who presented to L&D for induction of labor due to gestational hypertension. The patient progressed to complete dilation.  I was called to the room and the infant had just delivered and was placed on the maternal abdomen by the RN.  RN states the fetus delivered in the OA position.  Live male infant was noted to be vigorously crying.   After more than 1 minute, the umbilical cord was clamped and cut and cord gases were collected. The intact placenta was delivered with manual massage of the uterine fundus. Uterine bleeding was controlled with fundal massage and  oxytocin.  Bilateral periurethral lacerations were repaired with 4-0 Vicryl for good hemostasis. Cytotec 800mcg placed rectally. The vagina was explored with no retained sponges or foreign bodies noted. Both mother and  were in stable condition and recovering well in the delivery room.      Electronically signed by Carol Giron DO, 25, 8:13 PM EST.

## 2025-03-08 NOTE — PLAN OF CARE
Goal Outcome Evaluation:           Progress: improving  Outcome Evaluation: VSS, last blood pressure 132/62. Magnesium continues at 2 gm/hr. Generalized edema with 2+ edema of lower extremities noted, unchanged from initial assessment. DTRs 2+ with absent clonus. Denies headache, blurred vision or epigastric pain. F/C patent to BSD. I/O thus far this shift is 2486 in and 2125 out. FF with light rubra. No swelling at perineum and bilateral periurethral lacerations repair intact with no bleeding with mild bruising surrounding. No hematoma. Lungs essentially CTA with continuous pulse ox showing O2 sats 95-99 % on room air.

## 2025-03-09 RX ORDER — NIFEDIPINE 60 MG/1
60 TABLET, EXTENDED RELEASE ORAL EVERY MORNING
Status: DISCONTINUED | OUTPATIENT
Start: 2025-03-10 | End: 2025-03-10

## 2025-03-09 RX ORDER — NIFEDIPINE 30 MG
30 TABLET, EXTENDED RELEASE ORAL EVERY MORNING
Qty: 30 TABLET | Refills: 1 | Status: SHIPPED | OUTPATIENT
Start: 2025-03-10 | End: 2025-03-09 | Stop reason: HOSPADM

## 2025-03-09 RX ORDER — IBUPROFEN 800 MG/1
800 TABLET, FILM COATED ORAL EVERY 8 HOURS PRN
Qty: 30 TABLET | Refills: 1 | Status: SHIPPED | OUTPATIENT
Start: 2025-03-09

## 2025-03-09 RX ORDER — NIFEDIPINE 30 MG/1
30 TABLET, EXTENDED RELEASE ORAL EVERY MORNING
Status: DISCONTINUED | OUTPATIENT
Start: 2025-03-09 | End: 2025-03-09

## 2025-03-09 RX ADMIN — WITCH HAZEL: 500 SOLUTION RECTAL; TOPICAL at 04:10

## 2025-03-09 RX ADMIN — DOCUSATE SODIUM 100 MG: 100 CAPSULE, LIQUID FILLED ORAL at 09:12

## 2025-03-09 RX ADMIN — NIFEDIPINE 30 MG: 30 TABLET, FILM COATED, EXTENDED RELEASE ORAL at 07:56

## 2025-03-09 RX ADMIN — ACETAMINOPHEN 650 MG: 325 TABLET ORAL at 12:10

## 2025-03-09 RX ADMIN — ACETAMINOPHEN 650 MG: 325 TABLET ORAL at 23:37

## 2025-03-09 NOTE — DISCHARGE SUMMARY
[facility]  Delivery Discharge Summary    Patient Name: Jennifer Francis  : 2000  MRN: [id number]    Date of Admission: 3/7/2025  Date of Discharge:  3/10/2025   Primary Care Physician: Mey Manzanares APRN  OB Clinician: Carol Giron DO    Procedures:  3/7/2025 - Vaginal, Spontaneous     Admitting Diagnosis:  Encounter for induction of labor [Z34.90]    Discharge Diagnosis:  Same as Admitting plus:   Pregnancy at 37w1d - Delivered     Antepartum complications: pregnancy-induced hypertension    Delivery:   Date of Delivery: 3/7/2025  Time of Delivery: 7:43 PM  Delivered By:  Carol Giron  Delivery Type: Vaginal, Spontaneous   Anesthesia: Epidural   Intrapartum complications: PIH  Placenta: Spontaneous      Baby:  male infant;   Apgar:  8  @ 1 minute /   Apgar:  9  @ 5 minutes   Weight: 2615 g (5 lb 12.2 oz)   Length: 18.5    Feeding method: Breastfeeding Status: Yes    Discharge Details:     Discharge Medications        New Medications        Instructions Start Date   ibuprofen 800 MG tablet  Commonly known as: ADVIL,MOTRIN   800 mg, Oral, Every 8 Hours PRN             Changes to Medications        Instructions Start Date   NIFEdipine CC 30 MG 24 hr tablet  Commonly known as: ADALAT CC   30 mg, Oral, Every Morning   Start Date: 2025            Continue These Medications        Instructions Start Date   Hydrocortisone Ace-Pramoxine 1-1 % rectal cream   Rectal, 3 Times Daily PRN      nicotine 7 MG/24HR patch  Commonly known as: Nicoderm CQ   1 patch, Transdermal, Every 24 Hours      ondansetron ODT 4 MG disintegrating tablet  Commonly known as: ZOFRAN-ODT   4 mg, Translingual, Every 8 Hours PRN      Prenatal Multivitamin + DHA 28-0.8 & 200 MG misc   1 tablet, Oral, Daily               Allergies   Allergen Reactions    Coconut Unknown - Low Severity       Discharge Disposition:   Home, self-care    Discharge Condition:  Good    Follow Up:  Future Appointments   Date Time Provider Department Center    3/13/2025  9:15 AM Kesha Billy DO [facility]   3/20/2025  9:15 AM Kesha Billy DO [facility]   3/26/2025  1:00 PM Kesha Billy DO [facility]         Plan:  Follow up 1 weeks for BP check    Electronically signed by Carol Giron DO, 03/09/25, 8:11 AM EDT.       yes

## 2025-03-09 NOTE — LACTATION NOTE
1210 LN in to follow up with patient on breastfeeding progress. Patient reports tenderness of left nipple. On assessment, cracking noted at base. Discussed deep latching techniques with nipple shield use to prevent further breakdown. Patient will pump L breast for this feeding and apply hydrogel pad once done. Infant latched well to R breast and patient denied pain with feeding. Audible swallows noted.     1500 LN in to assist with sleepy infant at breast. Patient reports left nipple feeling much better and wanting to latch infant for feeding. Waking techniques used and infant able to latch deeply onto L breast, although less energetic this feeding. Circumcision was done this AM. Encouraged mother to continue skin to skin after feeding and explained that infant may want to feed again sooner next feed. Patient verbalized understanding. Offered continued assistance with next feeding as needed.

## 2025-03-09 NOTE — ANESTHESIA POSTPROCEDURE EVALUATION
Patient: Jennifer Francis    Procedure Summary       Date: 03/07/25 Room / Location:     Anesthesia Start: 1417 Anesthesia Stop: 1943    Procedure: LABOR ANALGESIA Diagnosis:     Scheduled Providers:  Provider: Christopher Garduno CRNA    Anesthesia Type: epidural ASA Status: 3            Anesthesia Type: epidural    Vitals  Vitals Value Taken Time   /88 03/09/25 14:09   Temp 37.1 °C (98.7 °F) 03/09/25 14:09   Pulse 78 03/09/25 14:09   Resp 18 03/09/25 14:09   SpO2 99 % 03/08/25 17:21           Post Anesthesia Care and Evaluation    Patient location during evaluation: bedside  Patient participation: complete - patient participated  Level of consciousness: awake and alert  Pain score: 2  Pain management: adequate    Airway patency: patent  Anesthetic complications: No anesthetic complications  PONV Status: none  Cardiovascular status: acceptable  Respiratory status: acceptable  Hydration status: acceptable  Post Neuraxial Block status: Motor and sensory function returned to baseline and No signs or symptoms of PDPHNo anesthesia care post op

## 2025-03-09 NOTE — PLAN OF CARE
Goal Outcome Evaluation:              Vital signs WNL, assessment WNL except generalized edema. Urine output adequate. Breast/bottle feeding. Pain well controlled.

## 2025-03-09 NOTE — PROGRESS NOTES
Postpartum Progress Note     PPD# 2    Jennifer Francis is a 24 y.o.  who is postpartum day #2 . Patient is doing well and denies any complaints. Patient states pain is contolled.  Patient states lochia is light. Patient denies any HA, vision changes, RUQ pain. Patient is breastfeed, bottle feed      Vitals:    25 0531   BP: 155/92   Pulse: 72   Resp: 18   Temp: 98 °F (36.7 °C)   SpO2:          Physical Exam:  HEENT:Normocephalic and atraumatic, NAD  Lungs: No labored breathing  Abdomen: Soft, appropriate tenderness to palpation, Uterine fundus firm and below the umbilicus  Extremities: 2+ edema    No results found for this or any previous visit (from the past 24 hours).]    ASSESSMENT/PLAN:    Patient is a 24 y.o.female who is PPD# 2 s/p    -Rh pos/Rubella immune   -Encourage ambulation    -breastfeed, bottle feed   -Patient denies any complaints, continue postpartum care      Preeclampsia  - s/p 24hr mag  - asymptomatic  - BP reviewed, most have been 150s/90s  >>will start Procardia 30mg XL daily   - continue to monitor    Anticipate DC home tomorrow    Carol Giron DO  3/9/2025 07:04 EDT

## 2025-03-09 NOTE — PLAN OF CARE
Goal Outcome Evaluation:  Plan of Care Reviewed With: patient           Outcome Evaluation: B/P [id number] today. Procardia 30 mg XL started daily this am. Up ad anne. Denies any h/a, blurred vision or epigastric pain.Generalized edema. Has voided 600ml ,500ml ,500ml,150 ml.

## 2025-03-09 NOTE — PLAN OF CARE
Goal Outcome Evaluation:  Plan of Care Reviewed With: patient           Outcome Evaluation: Vital signs WNL ranging 130-150's/ 81-98. Magnesium all day at 2 gm/hr. d/c'd at 1856. Generalized edema with diminished DTRs and no clonus. SCD's on.1500 ml IV intake.Output 3150 ml this shift. FF with light rubra. Lungs clear. Generalized aches and pains and gave Tylenol and Motrin on a scheduled basis rather than PRN. C/o H/A at 18:25 and Tylenol PO given

## 2025-03-10 ENCOUNTER — PATIENT OUTREACH (OUTPATIENT)
Dept: LABOR AND DELIVERY | Facility: HOSPITAL | Age: 25
End: 2025-03-10
Payer: MEDICAID

## 2025-03-10 VITALS
SYSTOLIC BLOOD PRESSURE: 144 MMHG | TEMPERATURE: 98.3 F | BODY MASS INDEX: 48.76 KG/M2 | OXYGEN SATURATION: 99 % | HEART RATE: 83 BPM | HEIGHT: 62 IN | DIASTOLIC BLOOD PRESSURE: 82 MMHG | WEIGHT: 265 LBS | RESPIRATION RATE: 18 BRPM

## 2025-03-10 RX ORDER — NIFEDIPINE 30 MG
30 TABLET, EXTENDED RELEASE ORAL EVERY MORNING
Qty: 30 TABLET | Refills: 1 | Status: SHIPPED | OUTPATIENT
Start: 2025-03-11

## 2025-03-10 RX ORDER — NIFEDIPINE 30 MG/1
30 TABLET, EXTENDED RELEASE ORAL EVERY MORNING
Status: DISCONTINUED | OUTPATIENT
Start: 2025-03-11 | End: 2025-03-10 | Stop reason: HOSPADM

## 2025-03-10 RX ADMIN — NIFEDIPINE 60 MG: 60 TABLET, EXTENDED RELEASE ORAL at 06:12

## 2025-03-10 RX ADMIN — DOCUSATE SODIUM 100 MG: 100 CAPSULE, LIQUID FILLED ORAL at 08:48

## 2025-03-10 RX ADMIN — ACETAMINOPHEN 650 MG: 325 TABLET ORAL at 10:29

## 2025-03-10 NOTE — PLAN OF CARE
Goal Outcome Evaluation:                                             PREOPERATIVE DIAGNOSIS:  Adenotonsillar hypertrophy and Sleep disordered breathing      POSTOPERATIVE DIAGNOSIS:  Same as preoperative diagnosis      PROCEDURE:  1) Adenotonsillectomy      SURGEON:  Brandon Medeiros Jr. MD      ASSISTANT:  none      FINDINGS:  Tonsils 2+  Adenoids 80% occlusive of nasopharynx      SPECIMENS REMOVED:  Tonsils, sent to pathology      ESTIMATED BLOOD LOSS:  2 ml      BLOOD ADMINISTATION:  none      COMPLICATIONS:  none      GRAFTS/IMPLANTS:  none      ANESTHESIA:  General      INDICATIONS:  3yo F Adenotonsillar hypertrophy and Obstructive sleep apnea (AHI 12.1, REM AHI 32.7) presents for above procedure.      DESCRIPTION OF PROCEDURE:  The patient was placed in supine position on the operating table.  Anesthesia was induced and the patient was intubated by anesthesia.  The patient was then rotated 90-degrees into the operating field.  The patient was then placed on a shoulder roll with the head extended and the McGivor mouth gag was placed and suspended from a Livingston stand.  The palate was inspected and was noted to be normal.  A curved Allis was used to grasp the left tonsil, which was retracted medially.  Coblation on a setting of 7 and 3 was used to dissect the tonsil from the tonsillar fossa.  This procedure was then repeated for the right tonsil.  A curved Allis was used to grasp the right tonsil, which was retracted medially.  Coblation on a setting of 7 and 3 was used to dissect the tonsil from the tonsillar fossa.    Red rubber catheters were then placed in bilateral nasal cavities and used to retract the palate anteriorly.  Coblation on settings of 9 and 3 was used to remove the adenoid tissue from the nasopharynx.  Any residual bleeding was cauterized in the adenoid bed and the bilateral tonsillar fossae.  The red rubber catheters were then removed and the McGivor mouthgag was relaxed for the 30 seconds and resuspended.  No further bleeding was appreciated.    The oropharynx  and nasal cavity were then irrigated with normal saline.  A suction catheter was passed into the stomach and subsequently removed.  The McGivor mouth gag was removed.  The patient was allowed to awaken and was transferred to the postoperative anesthesia care unit in stable condition.      DISPOSITION:  Admit for overnight observation

## 2025-03-10 NOTE — PROGRESS NOTES
Postpartum Progress Note     PPD#3     Jennifer Francis is a 24 y.o.  who is postpartum day #3. Patient is doing well and denies any complaints. Patient states pain is controlled.  Patient states lochia is light. Patient denies any HA, vision changes, RUQ pain. Patient is breastfeed, bottle feed      Vitals:    03/10/25 0525   BP: 136/79   Pulse: 68   Resp: 20   Temp: 98.3 °F (36.8 °C)   SpO2:          Physical Exam:  HEENT:Normocephalic and atraumatic, NAD  Lungs: No labored breathing  Abdomen: Soft, appropriate tenderness to palpation, Uterine fundus firm and below the umbilicus  Extremities: 1+    No results found for this or any previous visit (from the past 24 hours).]    ASSESSMENT/PLAN:    Patient is a 24 y.o.female who is PPD# 3 s/p    -Rh pos/Rubella immune   -Encourage ambulation    -breastfeed, bottle feed   -Patient denies any complaints, continue postpartum care      Preeclampsia  - s/p 24hr mag  - asymptomatic  - BP reviewed, mostly mild range. Improved after starting PO medication  >>continue Procardia 30mg XL daily     Stable for DC home, f/u in 1 week for BP check    Carol Giron DO  3/10/2025 06:41 EDT

## 2025-03-10 NOTE — LACTATION NOTE
LN in to follow up on breastfeeding progress. Patient reports positive breast changes today and states latching has been going better. Reports needing breast pump for home use. States the pump ordered through insurance has not shipped yet. Patient requested this LN reach out to [facility] on her behalf to cancel order in order for her to take a Spectra S2 pump from the hospital as patient is planning to discharge today. LN was able to have order cancelled and Spectra S2 given to patient. Set up pump for patient and discussed use. LN discussed good pumping guidelines and normal expectations with pumping and storage and preparation of ebm for feedings. Patient pumped for 15 minutes and was able to produce 7 mls. LN fed this to infant.     LN discussed normal infant output patterns to expect and if infant is not waking by 3 hours to wake and feed using measures shown in the hospital. LN discussed nipple care, plugged ducts, engorgement, and breast infection. LN discussed alcohol use and cigarette/second hand smoke around baby and breastfeeding and discussed the impact of street drugs on infants and breastfeeding. LN used the page in the breastfeeding guide to discuss harmful effects of these. LN discussed checking to make sure new medications are safe to breastfeed. Lactation expectations and anticipatory guidance discussed for the next two weeks. LN encouraged mom to see pediatrician within two days from discharge for follow up.  LN discussed breastfeeding/lactation resources available after discharge and when to call the doctor. All questions answered. Patient verbalized good understanding.

## 2025-03-10 NOTE — OUTREACH NOTE
[facility]  IP Postpartum    Questions/Answers      Flowsheet Row Responses   Best Method for Contacting Cell   Support Person Present Yes   Does the patient have a car seat at the hospital Yes   Delivery Note Reviewed Reviewed   Were birth expectations met? Yes   Is there a need for additional support/resources? No   Lactation Note Reviewed Reviewed   Is additional support needed? Yes   Yes, additional support needed comment needs help with pump - lactation notified   Any questions or concerns? No   Is the patient going to use Meds to Beds? Yes   Any concerns related discharge meds/ability to  prescriptions? No   OB Discharge Navigator Reviewed  Reviewed   Confirm Postpartum OB appointment Yes   Postpartum OB appointment date 25   Confirm initial well-child Pediatrician appointment date/time: Yes   Initial Well Child Pediatrician Appointment Date 25   Additional post-discharge F/U appointments No   Does patient have transportation to appointments? Yes   Any other assistance needed to ensure she is able to attend appointments? No   Does patient have supplies needed at home for  care? Clothing, Crib, Diapers, Breast Pump  [mom bought [employer] pumps for her to use at home until her pump from [facility] arrives]            Danni Zimmer RN  Maternity Nurse Navigator    3/10/2025, 10:24 EDT

## 2025-03-10 NOTE — PLAN OF CARE
Goal Outcome Evaluation:  Plan of Care Reviewed With: patient        Progress: improving  Outcome Evaluation: Pt is up ad anne. Pain is well controlled with prn tylenol and motrin. Bonding well with infant. Monitoring BP every 4 hours due to gestational HTN

## 2025-03-13 NOTE — PROGRESS NOTES
"Post Delivery EARLY Follow up (1-2weeks)        Chief Complaint   Patient presents with    Postpartum Care     Date of delivery: 3/7/25  Delivery type:    Feeding: Breast, Bottle    HPI:     HA:  yes,  resolved w rest and hydration  Vision changes:  no  RUQ/epigastric pain:  no  Swelling:  yes, mostly in legs    Pain:  no  Vaginal Bleeding:  yes, is decreasing    Depressed/Anxious:  no  EPDS score: 9   #10: 0    Weight at last OB OV: 265lb    Discharge Summary by Carol Giron DO (2025 08:11)  IOL for GHTN, on procardia 30xl    PHYSICAL EXAM:  /89   Pulse 84   Ht 157.5 cm (62\")   Wt 112 kg (248 lb)   LMP 2024   Breastfeeding Yes Comment: breast and bottle  BMI 45.36 kg/m²  25.4 kg (56 lb)  Chaperone present during breast and/or pelvic exam if performed.  General- NAD, alert and oriented, appropriate  Psych- Normal mood, good memory, good eye contact  Extremities- +1 edema, bilaterally equal, no signs of DVT, DTR patella +1    ASSESSMENT AND PLAN:  Diagnoses and all orders for this visit:    1. Gestational hypertension, third trimester (Primary)  Comments:  Now PP, stable on Procardia 30xl, continue. Check BP daily, can stop meds if BP normalizes  Overview:  Biweek NST Mon Thu  US growth  Del 37th week sooner prn mat/fetal indications   11Mar 37+5 IOL AP  HTN precautions, check BP at home to L+D for systolic 155 or higher or diastolic 100 or higher  2025 off work until delivery        Counseling:   Return to office/L+D for HA unrelieved w rest/hydration/OTC meds, vision changes, increase in edema, RUQ/epigastric pain, any concerns.  Check BPs at home.  Return to office or L+D if after hours for systolic >155 OR diastolic >105.      Follow Up:  Return in about 1 week (around 3/21/2025) for FU BP AND in 4weeks for PP check.            Kesha Billy DO  2025    [facility]  [facility] [place] [address]  Dept: [phone]  Dept " Fax: [phone]  Loc: [phone]  Loc Fax: [phone]

## 2025-03-14 ENCOUNTER — POSTPARTUM VISIT (OUTPATIENT)
Dept: OBSTETRICS AND GYNECOLOGY | Facility: CLINIC | Age: 25
End: 2025-03-14
Payer: MEDICAID

## 2025-03-14 VITALS
BODY MASS INDEX: 45.64 KG/M2 | WEIGHT: 248 LBS | DIASTOLIC BLOOD PRESSURE: 89 MMHG | SYSTOLIC BLOOD PRESSURE: 144 MMHG | HEIGHT: 62 IN | HEART RATE: 84 BPM

## 2025-03-14 DIAGNOSIS — O13.3 GESTATIONAL HYPERTENSION, THIRD TRIMESTER: Primary | ICD-10-CM

## 2025-03-15 ENCOUNTER — TELEPHONE (OUTPATIENT)
Dept: LACTATION | Facility: HOSPITAL | Age: 25
End: 2025-03-15
Payer: MEDICAID

## 2025-03-15 NOTE — TELEPHONE ENCOUNTER
ALMA called to check on this patient and her lactation progress. She states she is pumping now but is worried about her supply. She states she is only getting about 2 oz every 2-3 hours. ALMA discussed that this is a normal output at 1 week and within the next week she should increase another oz or more. If not to call back for more lactation guidance. She states she has the lactation dept phone number if needed.

## 2025-03-17 NOTE — PROGRESS NOTES
"Post Delivery EARLY Follow up (1-2weeks)        Chief Complaint   Patient presents with    Postpartum Care     Date of delivery: 3/7/25  Delivery type:    Feeding: Breast and bottle    HPI:     HA:  yes, last night, gone w motrin  Vision changes:  no  RUQ/epigastric pain:  no  Swelling:  yes, has gone down a bit     Pain:  no  Vaginal Bleeding:  yes, light     Depressed/Anxious:  yes, depressed.  Doing well declines meds.   EPDS score: 6   #10: 0    Weight at last OB OV: 265lb    Progress Notes by Kesha Billy DO (2025 14:45)  Discharge Summary by Carol Giron DO (2025 08:11)  IOL for GHTN, on procardia 30xl seen last week and blood pressure mildly elevated.  Has stopped procardia a day ago.  Has not checked BP at home.    PHYSICAL EXAM:  /89   Pulse 77   Ht 157.5 cm (62\")   Wt 109 kg (241 lb)   LMP 2024   Breastfeeding Yes Comment: breast and bottle  BMI 44.08 kg/m²  25.4 kg (56 lb)  Chaperone present during breast and/or pelvic exam if performed.  General- NAD, alert and oriented, appropriate  Psych- Normal mood, good memory, good eye contact  Extremities- +1 edema, bilaterally equal, no signs of DVT    ASSESSMENT AND PLAN:  Diagnoses and all orders for this visit:    1. History of gestational hypertension (Primary)  Comments:  resolved, can stop procardia      Counseling:   Return to office or L+D for HA unrelieved w rest/hydration/OTC meds, vision changes, increase in edema, RUQ/epigastric pain, any concerns.  Importance of checking BPs at home.  I recommend she restart her Procardia is systolic >155 OR diastolic >100.    Follow Up:  Return in about 2 weeks (around 4/3/2025) for Postpartum FU.            Kesha Billy DO  2025    [facility]  [address] [place] [address]  Dept: [phone]  Dept Fax: [phone]  Loc: [phone]  Loc Fax: [phone]   "

## 2025-03-18 ENCOUNTER — PATIENT OUTREACH (OUTPATIENT)
Dept: LABOR AND DELIVERY | Facility: HOSPITAL | Age: 25
End: 2025-03-18
Payer: MEDICAID

## 2025-03-18 NOTE — OUTREACH NOTE
[facility]    Postpartum EPDS sent via [employer].    Danni Zimmer RN  Maternity Nurse Navigator    3/18/2025, 13:19 EDT

## 2025-03-20 ENCOUNTER — POSTPARTUM VISIT (OUTPATIENT)
Dept: OBSTETRICS AND GYNECOLOGY | Facility: CLINIC | Age: 25
End: 2025-03-20
Payer: MEDICAID

## 2025-03-20 VITALS
HEIGHT: 62 IN | WEIGHT: 241 LBS | HEART RATE: 77 BPM | DIASTOLIC BLOOD PRESSURE: 89 MMHG | BODY MASS INDEX: 44.35 KG/M2 | SYSTOLIC BLOOD PRESSURE: 139 MMHG

## 2025-03-20 DIAGNOSIS — Z87.59 HISTORY OF GESTATIONAL HYPERTENSION: Primary | ICD-10-CM

## 2025-04-01 ENCOUNTER — PATIENT OUTREACH (OUTPATIENT)
Dept: LABOR AND DELIVERY | Facility: HOSPITAL | Age: 25
End: 2025-04-01
Payer: MEDICAID

## 2025-04-01 NOTE — OUTREACH NOTE
[facility]  Postpartum Check-In    Questions/Answers      Flowsheet Row Responses   Visit Setting Telephone   Best Method for Contacting Cell   OB Discharge Note Reviewed  Reviewed   OB Discharge Navigator Reviewed  Reviewed   OB Discharge Medications Reviewed  Reviewed    discharged home with mother? Yes   Current Pain Levels 0-10 0   At Rest Pain Levels 0-10 0   Pain level with activity 0-10 0   Acceptable Pain Level 0-10 0   Verbalized Emotional State Acceptance   Family/Support Network Family, Significant Other   Level of Involvement in Care Attentive, Interactive, Supportive   Do you feel comfortable in your relationship with your baby? Yes   Have members of your household adjusted to your baby? Yes   Is the baby's father supportive and/or involved with the baby? Yes   How does your partner feel about the baby? Happy, Involved   Do you feel safe at home, school and work? Yes   Are you in a relationship with someone who threatens you or hurts you? No   Do you have the resources to keep yourself and your baby healthy and safe? Yes   Lochia (per patient report) None noted   Amount None   Lochia Odor None   Is patient breastfeeding? Yes, pumping   Frequency QID   Pumping Quantity 2oz   Postpartum Depression Screening Education Education Provided   Doctor Appointments: Education Provided   Breastfeeding Education Education Provided   Family Planning Education Education Provided   Postpartum Care Education Education Provided   Followup Appointments Made Yes   Well Child Visit Appointments Made Yes   Did you complete the visit? Yes   Were there any specific concerns? No   Umbilical Cord No reported signs or symptoms   Was the baby circumcised? Yes   Circumcision care and signs/symptoms to report Reviewed   Infant Feeding Method Formula, Expressed Breast Milk   Formula Type Other   Other Formula Similac total comfort   Formula PO (mL) 2-5oz   Formula/Expressed Milk frequency of feedings: q2-3h    Expressed milk PO (mL) 2-5oz   Expressed milk- frequency of feedings q2-3h   Number of wet diapers x 24 hours 10   Last BM x 24 hours 3   What safe sleep surface is available? Bassinet   Are there stuffed animals, toys, pillows, quilts, blankets, wedges, positioners, bumpers or other loose bedding in the infant's sleeping environment? No   Where does the baby usually sleep? Bassinet   Does the baby ever share a sleep surface with a sibling, adult or pet? No   Does the baby ever share a sleep surface in a bed, couch, recliner or other? No   What position do you place your baby to sleep for naps? Back   What position do you place your baby to sleep at night Back   Are you and/or other caregivers smoking inside or outside the baby's home? No   Is the infant dressed appropiately for the temperature of the home? Yes   Do you use a clean, dry pacifier that is not attached to a string or stuffed animal? Yes            Review of Systems   All other systems reviewed and are negative.    Most Recent Lesage  Depression Scale Score (EPDS)    Performed by a clinician: 5 (2025  2:11 PM)    Doing well. No questions, needs or concerns.     5 Ps Screen  complete      Danni Zimmer RN  Maternity Nurse Navigator    2025, 14:13 EDT

## 2025-04-01 NOTE — OUTREACH NOTE
[facility]  Unable to Reach    Questions/Answers      Flowsheet Row Responses   Pending Outreach Postpartum Check-in   Call Attempt First   Outcome No answer/busy, Left message   Next Call Attempt Date 04/01/25              Danni Zimmer RN  Maternity Nurse Navigator    4/1/2025, 10:53 EDT

## 2025-04-07 ENCOUNTER — TELEPHONE (OUTPATIENT)
Dept: OBSTETRICS AND GYNECOLOGY | Age: 25
End: 2025-04-07

## 2025-04-07 ENCOUNTER — POSTPARTUM VISIT (OUTPATIENT)
Dept: OBSTETRICS AND GYNECOLOGY | Age: 25
End: 2025-04-07
Payer: MEDICAID

## 2025-04-07 ENCOUNTER — TELEPHONE (OUTPATIENT)
Dept: OBSTETRICS AND GYNECOLOGY | Age: 25
End: 2025-04-07
Payer: MEDICAID

## 2025-04-07 VITALS
DIASTOLIC BLOOD PRESSURE: 88 MMHG | SYSTOLIC BLOOD PRESSURE: 125 MMHG | WEIGHT: 245 LBS | HEIGHT: 62 IN | BODY MASS INDEX: 45.08 KG/M2

## 2025-04-07 DIAGNOSIS — Z30.011 ENCOUNTER FOR INITIAL PRESCRIPTION OF CONTRACEPTIVE PILLS: ICD-10-CM

## 2025-04-07 DIAGNOSIS — Z91.89 BREASTFEEDING PROBLEM: ICD-10-CM

## 2025-04-07 PROCEDURE — 99213 OFFICE O/P EST LOW 20 MIN: CPT | Performed by: NURSE PRACTITIONER

## 2025-04-07 RX ORDER — ACETAMINOPHEN AND CODEINE PHOSPHATE 120; 12 MG/5ML; MG/5ML
1 SOLUTION ORAL DAILY
Qty: 84 TABLET | Refills: 3 | Status: SHIPPED | OUTPATIENT
Start: 2025-04-07 | End: 2026-04-07

## 2025-04-07 RX ORDER — METOCLOPRAMIDE 10 MG/1
10 TABLET ORAL 3 TIMES DAILY
Qty: 45 TABLET | Refills: 0 | Status: SHIPPED | OUTPATIENT
Start: 2025-04-07 | End: 2025-04-22

## 2025-04-07 NOTE — LETTER
April 7, 2025     Patient: Jennifer Francis   YOB: 2000   Date of Visit: 4/7/2025       To Whom It May Concern:    It is my medical opinion that Jennifer Francis may return to work on 4/25/25 .           Sincerely,        DUNIA Johns    CC: No Recipients

## 2025-04-07 NOTE — TELEPHONE ENCOUNTER
Left message, need to reschedule her 4/7 appointment with Dr. Billy. Dr. Billy unable to be in office today.

## 2025-04-07 NOTE — PROGRESS NOTES
"POSTPARTUM Follow Up Visit      Chief Complaint   Patient presents with    Postpartum Care     4 week pp     HPI:    Date of delivery: 3/7/25  Delivery type:            Perineum : bilateral periurethral lacerations  Delivering Provider:   Dr. Carol Giron        Feeding: Breast and bottle  Pain:  no  Vaginal Bleeding:  no  Plans for BC:  Birth control pills    Depressed/Anxious:  no, states is doing well  EPDS score: 12  #10: 0    Weight at last OB OV:  265lb  Last pap date and result: 2024 negative    PAP, Liquid Based ([facility] Only) (2024 11:37)    Progress Notes by Kesha Billy DO (2025 11:00)    Discharge Summary by Carol Giron DO (2025 08:11)    PHYSICAL EXAM:  /88   Ht 157.5 cm (62.01\")   Wt 111 kg (245 lb)   LMP 2024   Breastfeeding Yes   BMI 44.80 kg/m²  25.4 kg (56 lb)  Chaperone present during breast and/or pelvic exam if performed.  General- NAD, alert and oriented, appropriate  Psych- Normal mood, good memory, good eye contact    Abdomen- Soft, non distended, non tender, no masses    External genitalia- Normal.    Urethra/Bladder/Vagina- Normal, no masses, non-tender  Prolapse : none noted    STITCHES : Intact, well approximated, no evidence of infection  Cervix- Normal, no lesions, no discharge, no CMT  Uterus- Normal size, shape & consistency.  Non tender, mobile.  Adnexa- Normal, no mass, non-tender    Lymphatic- No palpable groin nodes  Extremities- No edema    ASSESSMENT AND PLAN:  Diagnoses and all orders for this visit:    1. Postpartum follow-up (Primary)    2. Encounter for initial prescription of contraceptive pills  -     norethindrone (MICRONOR) 0.35 MG tablet; Take 1 tablet by mouth Daily.  Dispense: 84 tablet; Refill: 3    3. Breastfeeding problem  -     metoclopramide (REGLAN) 10 MG tablet; Take 1 tablet by mouth 3 (Three) Times a Day for 15 days.  Dispense: 45 tablet; Refill: 0      Counseling:    All birth control options reviewed in detail.  " R/B/A/SE/E of each wrt pts PMHx and prior BC use  May resume normal activities  Use backup contraception for 4 weeks after initiating chosen BC  Abstinence for 2 weeks  Recommend off work/school, until 4/25/25    Desires to try reglan to increase breastmilk, R/B reviewed.  Will try for two weeks then recheck.      Follow Up:  Return for Annual physical.      Wilver Forrester, APRN  04/07/2025    [facility] [address] [facility]  [address] [place] [address] [address] [place] [address]  Dept: [phone]  Dept Fax: [phone]  Loc: [phone]  Loc Fax: [phone]

## 2025-04-07 NOTE — TELEPHONE ENCOUNTER
Attempted to call pt - rcvd voice mail - left message requesting call back - Need to r/s todays 4/7 apptmt Dr. Billy out of office sick.  Transfer to office to do r/s days held for the r/s.

## 2025-04-08 ENCOUNTER — PATIENT OUTREACH (OUTPATIENT)
Dept: CALL CENTER | Facility: HOSPITAL | Age: 25
End: 2025-04-08
Payer: MEDICAID

## 2025-04-08 NOTE — OUTREACH NOTE
Motherhood Connection Survey      Flowsheet Row Responses   [facility] patient discharged from? [facility]   Week 1 attempt successful? Yes   Call start time 1552   Call end time 1556   Baby sex Boy   [place] discharged home with mother? Yes   Baby sex Boy   Delivery type Vaginal   Emotional state Acceptance   Family support Yes   Do you have all necessary resources to care for you and your baby?  Yes   Have members of your household adjusted to your baby? Yes   Did you have any problems with pre-eclampsia during this pregnancy? No   Did you have blood glucose issues during this pregnancy No   Lochia amount None   Did you have an episiotomy/tear/abdominal incision? Yes   Feeding Method Combination   Frequency just bottle, q 1-2hrs   Duration 4oz   Pumping Yes   Supplementing Formula, Breast Milk   Frequency q 1-2 hrs   Amount 4 oz   Number of wet diapers x 24 hours 20   Last BM x 24 hours 1   Umbilical Cord No reported signs or symptoms   Was the baby circumcised? Yes   Circumcision care and signs/symptoms to report Reviewed   Where does the baby usually sleep? Bassinet   Are there stuffed animals, toys, pillows, quilts, blankets, wedges, positioners, bumpers or other loose bedding in the infant's sleeping environment? No   Does the baby ever share a sleep surface in a bed, couch, recliner or other? No   What position do you lay your baby down to sleep? Back   Are you and/or other caregivers smoking inside or outside the baby's home? No   Mom appointment comments: pp f/u done   Baby appointment comments: Peds visits done, gaining weight   Call completed? Yes   How satisfied were you with the Motherhood Connection Program? 5              Tarah THACKER - Registered Nurse

## 2025-04-21 ENCOUNTER — TELEPHONE (OUTPATIENT)
Dept: OBSTETRICS AND GYNECOLOGY | Age: 25
End: 2025-04-21
Payer: MEDICAID